# Patient Record
Sex: MALE | Race: WHITE | NOT HISPANIC OR LATINO | Employment: FULL TIME | URBAN - METROPOLITAN AREA
[De-identification: names, ages, dates, MRNs, and addresses within clinical notes are randomized per-mention and may not be internally consistent; named-entity substitution may affect disease eponyms.]

---

## 2020-06-04 ENCOUNTER — VIRTUAL VISIT (OUTPATIENT)
Dept: FAMILY MEDICINE | Facility: CLINIC | Age: 40
End: 2020-06-04
Payer: COMMERCIAL

## 2020-06-04 DIAGNOSIS — Z83.49 FAMILY HISTORY OF HEMOCHROMATOSIS: ICD-10-CM

## 2020-06-04 PROCEDURE — 99203 OFFICE O/P NEW LOW 30 MIN: CPT | Mod: TEL | Performed by: FAMILY MEDICINE

## 2020-06-04 SDOH — HEALTH STABILITY: MENTAL HEALTH: HOW OFTEN DO YOU HAVE A DRINK CONTAINING ALCOHOL?: NOT ASKED

## 2020-06-04 NOTE — PROGRESS NOTES
"Kahlil Farley is a 39 year old male who is being evaluated via a billable telephone visit.      The patient has been notified of following:     \"This telephone visit will be conducted via a call between you and your physician/provider. We have found that certain health care needs can be provided without the need for a physical exam.  This service lets us provide the care you need with a short phone conversation.  If a prescription is necessary we can send it directly to your pharmacy.  If lab work is needed we can place an order for that and you can then stop by our lab to have the test done at a later time.    Telephone visits are billed at different rates depending on your insurance coverage. During this emergency period, for some insurers they may be billed the same as an in-person visit.  Please reach out to your insurance provider with any questions.    If during the course of the call the physician/provider feels a telephone visit is not appropriate, you will not be charged for this service.\"    Patient has given verbal consent for Telephone visit?  Yes    What phone number would you like to be contacted at? [phone]    How would you like to obtain your AVS? Mail a copy    Subjective     Kahlil Farley is a 39 year old male who presents via phone visit today for the following health issues:    HPI  Concern - Pt moved here a year ago from [place], and wants to establish care here. He recently got labs done for life insurance and results came back abnormal, would like these tests done here.     There is no problem list on file for this patient.    No past surgical history on file.    Social History     Tobacco Use     Smoking status: Never Smoker     Smokeless tobacco: Never Used   Substance Use Topics     Alcohol use: Yes     No family history on file.      No current outpatient medications on file.     Allergies   Allergen Reactions     No Clinical Screening - See Comments Hives     Per pt \"some kind of " "antibiotic but is unsure which one\"      No lab results found.   BP Readings from Last 3 Encounters:   No data found for BP    Wt Readings from Last 3 Encounters:   No data found for Wt                    Reviewed and updated as needed this visit by Provider         Review of Systems   Constitutional, HEENT, cardiovascular, pulmonary, gi and gu systems are negative, except as otherwise noted.       Objective   Reported vitals:  There were no vitals taken for this visit.   healthy, alert and no distress  PSYCH: Alert and oriented times 3; coherent speech, normal   rate and volume, able to articulate logical thoughts, able   to abstract reason, no tangential thoughts, no hallucinations   or delusions  His affect is normal  RESP: No cough, no audible wheezing, able to talk in full sentences  Remainder of exam unable to be completed due to telephone visits    Diagnostic Test Results:  Labs reviewed in Epic        Assessment/Plan:  1. Elevation of level of transaminase or lactic acid dehydrogenase (LDH)  Has been having increased AST/ ALT, failed passing life insurance setup. He has past h/o transaminitis,   Will have him to recheck the lab and consider US if indicated   - CBC with platelets and differential; Future  - Comprehensive metabolic panel (BMP + Alb, Alk Phos, ALT, AST, Total. Bili, TP); Future  - Lipid panel reflex to direct LDL Fasting; Future  - Ferritin; Future  - Iron and iron binding capacity; Future  - Drug abuse screen 8 urine (UR); Future    2. BMI 25.0-25.9,adult  Has Ht 5-11 and Wt 185  Doing regular exercise and watching diet, will review the lab and update pt    - CBC with platelets and differential; Future  - Comprehensive metabolic panel (BMP + Alb, Alk Phos, ALT, AST, Total. Bili, TP); Future  - Lipid panel reflex to direct LDL Fasting; Future  - Ferritin; Future  - Iron and iron binding capacity; Future  - Drug abuse screen 8 urine (UR); Future    3. Family history of hemochromatosis  Has no " sx, due to the family h/o he has been monitoring, will recheck lab for further evaluation   - CBC with platelets and differential; Future  - Comprehensive metabolic panel (BMP + Alb, Alk Phos, ALT, AST, Total. Bili, TP); Future  - Lipid panel reflex to direct LDL Fasting; Future  - Ferritin; Future  - Iron and iron binding capacity; Future  - Drug abuse screen 8 urine (UR); Future    Return in about 3 months (around 9/4/2020) for Physical Exam, Lab Work.        He stated he has not on any med or drug, but wants to do the drug screening to prove the life insurance company as additional documents, urine toxicology screening order placed      Phone call duration:  13 minutes    Randall Rodríguez MD

## 2020-06-08 DIAGNOSIS — Z83.49 FAMILY HISTORY OF HEMOCHROMATOSIS: ICD-10-CM

## 2020-06-08 LAB
BASOPHILS # BLD AUTO: 0 10E9/L (ref 0–0.2)
BASOPHILS NFR BLD AUTO: 0.6 %
DIFFERENTIAL METHOD BLD: NORMAL
EOSINOPHIL # BLD AUTO: 0.2 10E9/L (ref 0–0.7)
EOSINOPHIL NFR BLD AUTO: 2.6 %
ERYTHROCYTE [DISTWIDTH] IN BLOOD BY AUTOMATED COUNT: 12.4 % (ref 10–15)
HCT VFR BLD AUTO: 40.7 % (ref 40–53)
HGB BLD-MCNC: 14.1 G/DL (ref 13.3–17.7)
LYMPHOCYTES # BLD AUTO: 1.8 10E9/L (ref 0.8–5.3)
LYMPHOCYTES NFR BLD AUTO: 28.4 %
MCH RBC QN AUTO: 31.8 PG (ref 26.5–33)
MCHC RBC AUTO-ENTMCNC: 34.6 G/DL (ref 31.5–36.5)
MCV RBC AUTO: 92 FL (ref 78–100)
MONOCYTES # BLD AUTO: 0.4 10E9/L (ref 0–1.3)
MONOCYTES NFR BLD AUTO: 7.1 %
NEUTROPHILS # BLD AUTO: 3.8 10E9/L (ref 1.6–8.3)
NEUTROPHILS NFR BLD AUTO: 61.3 %
PLATELET # BLD AUTO: 271 10E9/L (ref 150–450)
RBC # BLD AUTO: 4.43 10E12/L (ref 4.4–5.9)
WBC # BLD AUTO: 6.2 10E9/L (ref 4–11)

## 2020-06-08 PROCEDURE — 80053 COMPREHEN METABOLIC PANEL: CPT | Performed by: FAMILY MEDICINE

## 2020-06-08 PROCEDURE — 36415 COLL VENOUS BLD VENIPUNCTURE: CPT | Performed by: FAMILY MEDICINE

## 2020-06-08 PROCEDURE — 80061 LIPID PANEL: CPT | Performed by: FAMILY MEDICINE

## 2020-06-08 PROCEDURE — 85025 COMPLETE CBC W/AUTO DIFF WBC: CPT | Performed by: FAMILY MEDICINE

## 2020-06-08 PROCEDURE — 80307 DRUG TEST PRSMV CHEM ANLYZR: CPT | Performed by: FAMILY MEDICINE

## 2020-06-08 PROCEDURE — 86803 HEPATITIS C AB TEST: CPT | Performed by: FAMILY MEDICINE

## 2020-06-08 PROCEDURE — 87340 HEPATITIS B SURFACE AG IA: CPT | Performed by: FAMILY MEDICINE

## 2020-06-08 PROCEDURE — 86706 HEP B SURFACE ANTIBODY: CPT | Performed by: FAMILY MEDICINE

## 2020-06-08 PROCEDURE — 83540 ASSAY OF IRON: CPT | Performed by: FAMILY MEDICINE

## 2020-06-08 PROCEDURE — 83550 IRON BINDING TEST: CPT | Performed by: FAMILY MEDICINE

## 2020-06-08 PROCEDURE — 80320 DRUG SCREEN QUANTALCOHOLS: CPT | Performed by: FAMILY MEDICINE

## 2020-06-08 PROCEDURE — 82728 ASSAY OF FERRITIN: CPT | Performed by: FAMILY MEDICINE

## 2020-06-09 DIAGNOSIS — R74.01 TRANSAMINITIS: Primary | ICD-10-CM

## 2020-06-09 LAB
ALBUMIN SERPL-MCNC: 3.8 G/DL (ref 3.4–5)
ALP SERPL-CCNC: 144 U/L (ref 40–150)
ALT SERPL W P-5'-P-CCNC: 145 U/L (ref 0–70)
AMPHETAMINES UR QL SCN: NEGATIVE
ANION GAP SERPL CALCULATED.3IONS-SCNC: 8 MMOL/L (ref 3–14)
AST SERPL W P-5'-P-CCNC: 71 U/L (ref 0–45)
BARBITURATES UR QL: NEGATIVE
BENZODIAZ UR QL: NEGATIVE
BILIRUB SERPL-MCNC: 0.7 MG/DL (ref 0.2–1.3)
BUN SERPL-MCNC: 9 MG/DL (ref 7–30)
CALCIUM SERPL-MCNC: 9 MG/DL (ref 8.5–10.1)
CANNABINOIDS UR QL SCN: NEGATIVE
CHLORIDE SERPL-SCNC: 107 MMOL/L (ref 94–109)
CHOLEST SERPL-MCNC: 233 MG/DL
CO2 SERPL-SCNC: 23 MMOL/L (ref 20–32)
COCAINE UR QL: NEGATIVE
CREAT SERPL-MCNC: 0.77 MG/DL (ref 0.66–1.25)
ETHANOL UR QL SCN: NEGATIVE
FERRITIN SERPL-MCNC: 191 NG/ML (ref 26–388)
GFR SERPL CREATININE-BSD FRML MDRD: >90 ML/MIN/{1.73_M2}
GLUCOSE SERPL-MCNC: 76 MG/DL (ref 70–99)
HBV SURFACE AB SERPL IA-ACNC: 56.47 M[IU]/ML
HBV SURFACE AG SERPL QL IA: NONREACTIVE
HCV AB SERPL QL IA: NONREACTIVE
HDLC SERPL-MCNC: 56 MG/DL
IRON SATN MFR SERPL: 52 % (ref 15–46)
IRON SERPL-MCNC: 167 UG/DL (ref 35–180)
LDLC SERPL CALC-MCNC: 158 MG/DL
NONHDLC SERPL-MCNC: 177 MG/DL
OPIATES UR QL SCN: NEGATIVE
PCP UR QL SCN: NEGATIVE
POTASSIUM SERPL-SCNC: 3.8 MMOL/L (ref 3.4–5.3)
PROT SERPL-MCNC: 7.6 G/DL (ref 6.8–8.8)
SODIUM SERPL-SCNC: 138 MMOL/L (ref 133–144)
TIBC SERPL-MCNC: 322 UG/DL (ref 240–430)
TRIGL SERPL-MCNC: 94 MG/DL

## 2020-06-23 ENCOUNTER — HOSPITAL ENCOUNTER (OUTPATIENT)
Dept: ULTRASOUND IMAGING | Facility: CLINIC | Age: 40
Discharge: HOME | End: 2020-06-23
Attending: FAMILY MEDICINE | Admitting: FAMILY MEDICINE
Payer: COMMERCIAL

## 2020-06-23 DIAGNOSIS — R74.01 TRANSAMINITIS: ICD-10-CM

## 2020-06-23 PROCEDURE — 76700 US EXAM ABDOM COMPLETE: CPT

## 2020-06-26 ENCOUNTER — MYC MEDICAL ADVICE (OUTPATIENT)
Dept: FAMILY MEDICINE | Facility: CLINIC | Age: 40
End: 2020-06-26

## 2020-06-26 NOTE — TELEPHONE ENCOUNTER
Please see "[employer] message and advise.      Thank you,  Angie GELLERRN BSN  [facility]  [phone]

## 2020-10-29 ENCOUNTER — OFFICE VISIT (OUTPATIENT)
Dept: FAMILY MEDICINE | Facility: CLINIC | Age: 40
End: 2020-10-29
Payer: COMMERCIAL

## 2020-10-29 VITALS
HEIGHT: 71 IN | DIASTOLIC BLOOD PRESSURE: 74 MMHG | WEIGHT: 178 LBS | TEMPERATURE: 98 F | OXYGEN SATURATION: 99 % | SYSTOLIC BLOOD PRESSURE: 128 MMHG | HEART RATE: 79 BPM | BODY MASS INDEX: 24.92 KG/M2

## 2020-10-29 DIAGNOSIS — Z23 NEED FOR PROPHYLACTIC VACCINATION AND INOCULATION AGAINST INFLUENZA: ICD-10-CM

## 2020-10-29 DIAGNOSIS — Z00.00 ROUTINE GENERAL MEDICAL EXAMINATION AT A HEALTH CARE FACILITY: Primary | ICD-10-CM

## 2020-10-29 DIAGNOSIS — K40.90 RIGHT INGUINAL HERNIA: ICD-10-CM

## 2020-10-29 DIAGNOSIS — F33.9 EPISODE OF RECURRENT MAJOR DEPRESSIVE DISORDER, UNSPECIFIED DEPRESSION EPISODE SEVERITY (H): ICD-10-CM

## 2020-10-29 PROCEDURE — 90471 IMMUNIZATION ADMIN: CPT | Performed by: FAMILY MEDICINE

## 2020-10-29 PROCEDURE — 90686 IIV4 VACC NO PRSV 0.5 ML IM: CPT | Performed by: FAMILY MEDICINE

## 2020-10-29 PROCEDURE — 99396 PREV VISIT EST AGE 40-64: CPT | Mod: 25 | Performed by: FAMILY MEDICINE

## 2020-10-29 RX ORDER — DULOXETIN HYDROCHLORIDE 20 MG/1
20 CAPSULE, DELAYED RELEASE ORAL DAILY
Qty: 90 CAPSULE | Refills: 1 | Status: SHIPPED | OUTPATIENT
Start: 2020-10-29 | End: 2021-04-21

## 2020-10-29 ASSESSMENT — MIFFLIN-ST. JEOR: SCORE: 1734.53

## 2020-10-29 NOTE — PROGRESS NOTES
3  SUBJECTIVE:   CC: Kahlil Farley is an 39 year old male who presents for preventive health visit.     Patient has been advised of split billing requirements and indicates understanding: Yes  Healthy Habits:  Answers for HPI/ROS submitted by the patient on 10/26/2020   Annual Exam:  Frequency of exercise:: 4-5 days/week  Getting at least 3 servings of Calcium per day:: NO  Diet:: Regular (no restrictions)  Taking medications regularly:: Yes  Medication side effects:: Not applicable  Bi-annual eye exam:: Yes  Dental care twice a year:: NO  Sleep apnea or symptoms of sleep apnea:: None  Additional concerns today:: Yes  Duration of exercise:: 15-30 minutes          Today's PHQ-2 Score:   PHQ-2 ( 1999 [employer]) 10/26/2020   Q1: Little interest or pleasure in doing things 0   Q2: Feeling down, depressed or hopeless 1   PHQ-2 Score 1   Q1: Little interest or pleasure in doing things Not at all   Q2: Feeling down, depressed or hopeless Several days   PHQ-2 Score 1       Abuse: Current or Past(Physical, Sexual or Emotional)- No  Do you feel safe in your environment? Yes        Social History     Tobacco Use     Smoking status: Never Smoker     Smokeless tobacco: Never Used   Substance Use Topics     Alcohol use: Yes     If you drink alcohol do you typically have >3 drinks per day or >7 drinks per week? No                      Last PSA: No results found for: PSA    Reviewed orders with patient. Reviewed health maintenance and updated orders accordingly - Yes  BP Readings from Last 3 Encounters:   10/29/20 128/74    Wt Readings from Last 3 Encounters:   10/29/20 80.7 kg (178 lb)                  There is no problem list on file for this patient.    No past surgical history on file.    Social History     Tobacco Use     Smoking status: Never Smoker     Smokeless tobacco: Never Used   Substance Use Topics     Alcohol use: Yes     No family history on file.      Current Outpatient Medications   Medication Sig Dispense Refill      "DULoxetine (CYMBALTA) 20 MG capsule Take 1 capsule (20 mg) by mouth daily 90 capsule 1     Allergies   Allergen Reactions     No Clinical Screening - See Comments Hives     Per pt \"some kind of antibiotic but is unsure which one\"      Recent Labs   Lab Test 06/08/20  1316   *   HDL 56   TRIG 94   *   CR 0.77   GFRESTIMATED >90   GFRESTBLACK >90   POTASSIUM 3.8        Reviewed and updated as needed this visit by clinical staff                 Reviewed and updated as needed this visit by Provider                No past medical history on file.   No past surgical history on file.    ROS:  CONSTITUTIONAL: NEGATIVE for fever, chills, change in weight  INTEGUMENTARY/SKIN: NEGATIVE for worrisome rashes, moles or lesions  EYES: NEGATIVE for vision changes or irritation  ENT: NEGATIVE for ear, mouth and throat problems  RESP: NEGATIVE for significant cough or SOB  CV: NEGATIVE for chest pain, palpitations or peripheral edema  GI: NEGATIVE for nausea, abdominal pain, heartburn, or change in bowel habits   male: negative for dysuria, hematuria, decreased urinary stream, erectile dysfunction, urethral discharge  MUSCULOSKELETAL: NEGATIVE for significant arthralgias or myalgia  NEURO: NEGATIVE for weakness, dizziness or paresthesias  PSYCHIATRIC: NEGATIVE for changes in mood or affect    OBJECTIVE:   /74   Pulse 79   Temp 98  F (36.7  C) (Tympanic)   Ht 1.803 m (5' 11\")   Wt 80.7 kg (178 lb)   SpO2 99%   BMI 24.83 kg/m    EXAM:  GENERAL: healthy, alert and no distress  EYES: Eyes grossly normal to inspection, PERRL and conjunctivae and sclerae normal  HENT: ear canals and TM's normal, nose and mouth without ulcers or lesions  NECK: no adenopathy, no asymmetry, masses, or scars and thyroid normal to palpation  RESP: lungs clear to auscultation - no rales, rhonchi or wheezes  CV: regular rate and rhythm, normal S1 S2, no S3 or S4, no murmur, click or rub, no peripheral edema and peripheral pulses " "strong  ABDOMEN: soft, nontender, no hepatosplenomegaly, no masses and bowel sounds normal   (male): testicles normal without atrophy or masses, hernia right and penis normal without urethral discharge  MS: no gross musculoskeletal defects noted, no edema  NEURO: Normal strength and tone, mentation intact and speech normal  BACK: no CVA tenderness, no paralumbar tenderness        ASSESSMENT/PLAN:       ICD-10-CM    1. Routine general medical examination at a health care facility  Z00.00 CBC with platelets     Comprehensive metabolic panel (BMP + Alb, Alk Phos, ALT, AST, Total. Bili, TP)     Lipid panel reflex to direct LDL Fasting     CANCELED: INFLUENZA VACCINE IM > 6 MONTHS VALENT IIV4 [47375]     CANCELED: CBC with platelets     CANCELED: Comprehensive metabolic panel (BMP + Alb, Alk Phos, ALT, AST, Total. Bili, TP)     CANCELED: Lipid panel reflex to direct LDL Fasting   2. Right inguinal hernia  K40.90 GENERAL SURG ADULT REFERRAL   3. Episode of recurrent major depressive disorder, unspecified depression episode severity (H)  F33.9 DULoxetine (CYMBALTA) 20 MG capsule       Patient has been advised of split billing requirements and indicates understanding: Yes  COUNSELING:  Reviewed preventive health counseling, as reflected in patient instructions    Estimated body mass index is 24.83 kg/m  as calculated from the following:    Height as of this encounter: 1.803 m (5' 11\").    Weight as of this encounter: 80.7 kg (178 lb).        He reports that he has never smoked. He has never used smokeless tobacco.      Counseling Resources:  ATP IV Guidelines  Pooled Cohorts Equation Calculator  FRAX Risk Assessment  ICSI Preventive Guidelines  Dietary Guidelines for Americans, 2010  USDA's MyPlate  ASA Prophylaxis  Lung CA Screening    Randall Rodríguez MD  [facility]  "

## 2020-11-06 ENCOUNTER — OFFICE VISIT (OUTPATIENT)
Dept: SURGERY | Facility: CLINIC | Age: 40
End: 2020-11-06
Attending: FAMILY MEDICINE
Payer: COMMERCIAL

## 2020-11-06 VITALS
WEIGHT: 178 LBS | HEIGHT: 71 IN | BODY MASS INDEX: 24.92 KG/M2 | HEART RATE: 71 BPM | SYSTOLIC BLOOD PRESSURE: 104 MMHG | DIASTOLIC BLOOD PRESSURE: 60 MMHG

## 2020-11-06 DIAGNOSIS — K42.9 UMBILICAL HERNIA WITHOUT OBSTRUCTION AND WITHOUT GANGRENE: ICD-10-CM

## 2020-11-06 DIAGNOSIS — K40.20 NON-RECURRENT BILATERAL INGUINAL HERNIA WITHOUT OBSTRUCTION OR GANGRENE: Primary | ICD-10-CM

## 2020-11-06 PROCEDURE — 99204 OFFICE O/P NEW MOD 45 MIN: CPT | Performed by: SURGERY

## 2020-11-06 ASSESSMENT — MIFFLIN-ST. JEOR: SCORE: 1734.53

## 2020-11-06 NOTE — LETTER
"2020       Re: Kahlil Farley - 1980    HISTORY OF PRESENT ILLNESS:  Kahlil Farley is a 39 year old male who is seen in consultation at the request of Dr. Rodríguez for evaluation of right inguinal hernia.  The patient reports that he has noticed a bulge present in the right inguinal region for approximately 7 to 8 years.  In the last year, the bulge has increased in size.  He does have discomfort associated with the bulge.  His symptoms seem to be worse with increased activities.  He does state that the bulge is always reducible.  He denies any obstructive symptoms.  The patient has not noticed any bulging or discomfort present in the left inguinal area or at the umbilicus.  No previous abdominal surgical procedures.  No significant family history of hernias.  Patient has not undergone any previous hernia repairs.  He denies use of tobacco.     REVIEW OF SYSTEMS:  Constitutional: No fevers or chills  Eyes: No blurred or double vision  HENT: Denies headaches, No rhinorrhea, No sore throat  Respiratory: No cough or shortness of breath  Cardiovascular: Denies chest pain or palpitations  Gastrointestinal: No abdominal pain or nausea/vomiting  Genitourinary: No hematuria or dysuria  Musculoskeletal: Denies arthralgias or myalgias  Neurologic: No numbness or tingling  Integumentary: No skin rashes     Past medical history:  -Depression     Past surgical history:  -No previous abdominal surgical procedures     Family history:  -No history of hernias     Vitals: /60   Pulse 71   Ht 1.803 m (5' 11\")   Wt 80.7 kg (178 lb)   BMI 24.83 kg/m    BMI= Body mass index is 24.83 kg/m .     EXAM:  General: Vital signs reviewed, in no apparent distress  Eyes: Anicteric  HENT: Normocephalic, atraumatic, trachea midline   Respiratory: Breathing nonlabored  Cardiovascular: Regular rate and rhythm  GI: Abdomen soft, nondistended, nontender; reducible umbilical hernia with approximately 1 cm fascial defect; " reducible moderate sized right inguinal hernia and small left inguinal hernia  Musculoskeletal: No gross deformities  Neurologic: Grossly nonfocal exam  Psychiatric: Normal mood, affect and insight  Integumentary: Warm and dry      ASSESSMENT:  Kahlil Farley is a 39 year old who presents with umbilical hernia and bilateral inguinal hernias. Significant pertinent co-morbidities include: None.      PLAN:  Following a thorough discussion with the patient, the decision was made to proceed to the operating room for robotic repair of the patient's umbilical hernia and bilateral inguinal hernias with placement of mesh.  The risks and benefits of the procedure were discussed with the patient.  Surgery will be scheduled at the patient's earliest convenience.     It was my pleasure to participate in the care of Kahlil Farley in clinic today. Thank you for this consultation.            Mya Ji MD

## 2020-11-06 NOTE — PROGRESS NOTES
"[facility] General Surgery Clinic Consultation    CHIEF COMPLAINT:  Chief Complaint   Patient presents with     Consult     [facility]        HISTORY OF PRESENT ILLNESS:  Kahlil Farley is a 39 year old male who is seen in consultation at the request of Dr. Rodríguez for evaluation of right inguinal hernia.  The patient reports that he has noticed a bulge present in the right inguinal region for approximately 7 to 8 years.  In the last year, the bulge has increased in size.  He does have discomfort associated with the bulge.  His symptoms seem to be worse with increased activities.  He does state that the bulge is always reducible.  He denies any obstructive symptoms.  The patient has not noticed any bulging or discomfort present in the left inguinal area or at the umbilicus.  No previous abdominal surgical procedures.  No significant family history of hernias.  Patient has not undergone any previous hernia repairs.  He denies use of tobacco.    REVIEW OF SYSTEMS:  Constitutional: No fevers or chills  Eyes: No blurred or double vision  HENT: Denies headaches, No rhinorrhea, No sore throat  Respiratory: No cough or shortness of breath  Cardiovascular: Denies chest pain or palpitations  Gastrointestinal: No abdominal pain or nausea/vomiting  Genitourinary: No hematuria or dysuria  Musculoskeletal: Denies arthralgias or myalgias  Neurologic: No numbness or tingling  Integumentary: No skin rashes    Past medical history:  -Depression    Past surgical history:  -No previous abdominal surgical procedures    Family history:  -No history of hernias    Social History     Tobacco Use     Smoking status: Never Smoker     Smokeless tobacco: Never Used   Substance Use Topics     Alcohol use: Yes       There is no problem list on file for this patient.      Allergies   Allergen Reactions     No Clinical Screening - See Comments Hives     Per pt \"some kind of antibiotic but is unsure which one\"        Current Outpatient Medications   Medication " "Sig Dispense Refill     DULoxetine (CYMBALTA) 20 MG capsule Take 1 capsule (20 mg) by mouth daily 90 capsule 1       Vitals: /60   Pulse 71   Ht 1.803 m (5' 11\")   Wt 80.7 kg (178 lb)   BMI 24.83 kg/m    BMI= Body mass index is 24.83 kg/m .    EXAM:  General: Vital signs reviewed, in no apparent distress  Eyes: Anicteric  HENT: Normocephalic, atraumatic, trachea midline   Respiratory: Breathing nonlabored  Cardiovascular: Regular rate and rhythm  GI: Abdomen soft, nondistended, nontender; reducible umbilical hernia with approximately 1 cm fascial defect; reducible moderate sized right inguinal hernia and small left inguinal hernia  Musculoskeletal: No gross deformities  Neurologic: Grossly nonfocal exam  Psychiatric: Normal mood, affect and insight  Integumentary: Warm and dry      ASSESSMENT:  Kahlil Farley is a 39 year old who presents with umbilical hernia and bilateral inguinal hernias.  Significant pertinent co-morbidities include: None.       PLAN:  Following a thorough discussion with the patient, the decision was made to proceed to the operating room for robotic repair of the patient's umbilical hernia and bilateral inguinal hernias with placement of mesh.  The risks and benefits of the procedure were discussed with the patient.  Surgery will be scheduled at the patient's earliest convenience.    It was my pleasure to participate in the care of Kahlil Farley in clinic today. Thank you for this consultation.         Mya Ji MD    Please route or send letter to:  Primary Care Provider (PCP) and Referring Provider    "

## 2020-11-23 ENCOUNTER — OFFICE VISIT (OUTPATIENT)
Dept: FAMILY MEDICINE | Facility: CLINIC | Age: 40
End: 2020-11-23
Payer: COMMERCIAL

## 2020-11-23 VITALS
WEIGHT: 179 LBS | HEIGHT: 71 IN | SYSTOLIC BLOOD PRESSURE: 130 MMHG | TEMPERATURE: 97.9 F | BODY MASS INDEX: 25.06 KG/M2 | OXYGEN SATURATION: 98 % | DIASTOLIC BLOOD PRESSURE: 76 MMHG | HEART RATE: 58 BPM

## 2020-11-23 DIAGNOSIS — Z01.818 PREOP GENERAL PHYSICAL EXAM: Primary | ICD-10-CM

## 2020-11-23 LAB — HGB BLD-MCNC: 13.6 G/DL (ref 13.3–17.7)

## 2020-11-23 PROCEDURE — 85018 HEMOGLOBIN: CPT | Performed by: FAMILY MEDICINE

## 2020-11-23 PROCEDURE — 36415 COLL VENOUS BLD VENIPUNCTURE: CPT | Performed by: FAMILY MEDICINE

## 2020-11-23 PROCEDURE — 99214 OFFICE O/P EST MOD 30 MIN: CPT | Performed by: FAMILY MEDICINE

## 2020-11-23 ASSESSMENT — MIFFLIN-ST. JEOR: SCORE: 1739.07

## 2020-11-23 NOTE — PATIENT INSTRUCTIONS

## 2020-11-23 NOTE — PROGRESS NOTES
[address] [address]  Phone: [phone]  Primary Provider: Eliezer Rodríguez  Pre-op Performing Provider: ELIEZER RODRÍGUEZ    PREOPERATIVE EVALUATION:  Today's date: 11/23/2020    Kahlil Farley is a 39 year old male who presents for a preoperative evaluation.    Surgical Information:  Surgery/Procedure: Hernia  Surgery Location: [facility]  Surgeon: Dr. Ji  Surgery Date: 12/1/20  Time of Surgery: 830am  Where patient plans to recover: At home with family  Fax number for surgical facility: [phone]    Type of Anesthesia Anticipated: General    Subjective     HPI related to upcoming procedure:     Preop Questions 11/16/2020   1. Have you ever had a heart attack or stroke? No   2. Have you ever had surgery on your heart or blood vessels, such as a stent placement, a coronary artery bypass, or surgery on an artery in your head, neck, heart, or legs? No   3. Do you have chest pain with activity? No   4. Do you have a history of  heart failure? No   5. Do you currently have a cold, bronchitis or symptoms of other infection? No   6. Do you have a cough, shortness of breath, or wheezing? No   7. Do you or anyone in your family have previous history of blood clots? No   8. Do you or does anyone in your family have a serious bleeding problem such as prolonged bleeding following surgeries or cuts? No   9. Have you ever had problems with anemia or been told to take iron pills? No   10. Have you had any abnormal blood loss such as black, tarry or bloody stools? No   11. Have you ever had a blood transfusion? No   12. Are you willing to have a blood transfusion if it is medically needed before, during, or after your surgery? Yes   13. Have you or any of your relatives ever had problems with anesthesia? No   14. Do you have sleep apnea, excessive snoring or daytime drowsiness? No   15. Do you have any artifical heart valves or other implanted medical  "devices like a pacemaker, defibrillator, or continuous glucose monitor? No   16. Do you have artificial joints? No   17. Are you allergic to latex? No       Health Care Directive:  Patient does not have a Health Care Directive or Living Will: Discussed advance care planning with patient; however, patient declined at this time.    Preoperative Review of :   reviewed - no record of controlled substances prescribed.          Review of Systems  CONSTITUTIONAL: NEGATIVE for fever, chills, change in weight  ENT/MOUTH: NEGATIVE for ear, mouth and throat problems  RESP: NEGATIVE for significant cough or SOB  CV: NEGATIVE for chest pain, palpitations or peripheral edema    There are no active problems to display for this patient.     No past medical history on file.  No past surgical history on file.  Current Outpatient Medications   Medication Sig Dispense Refill     DULoxetine (CYMBALTA) 20 MG capsule Take 1 capsule (20 mg) by mouth daily 90 capsule 1       Allergies   Allergen Reactions     No Clinical Screening - See Comments Hives     Per pt \"some kind of antibiotic but is unsure which one\"         Social History     Tobacco Use     Smoking status: Never Smoker     Smokeless tobacco: Never Used   Substance Use Topics     Alcohol use: Yes     No family history on file.  History   Drug Use Unknown         Objective     /76   Pulse 58   Temp 97.9  F (36.6  C) (Tympanic)   Ht 1.803 m (5' 11\")   Wt 81.2 kg (179 lb)   SpO2 98%   BMI 24.97 kg/m      Physical Exam  GENERAL APPEARANCE: healthy, alert and no distress  HENT: ear canals and TM's normal and nose and mouth without ulcers or lesions  RESP: lungs clear to auscultation - no rales, rhonchi or wheezes  CV: regular rate and rhythm, normal S1 S2, no S3 or S4 and no murmur, click or rub   ABDOMEN: soft, nontender, no HSM or masses and bowel sounds normal  NEURO: Normal strength and tone, sensory exam grossly normal, mentation intact and speech " normal    Recent Labs   Lab Test 06/08/20  1316   HGB 14.1         POTASSIUM 3.8   CR 0.77        Diagnostics:  Recent Results (from the past 24 hour(s))   Hemoglobin    Collection Time: 11/23/20  5:13 PM   Result Value Ref Range    Hemoglobin 13.6 13.3 - 17.7 g/dL      No EKG required, no history of coronary heart disease, significant arrhythmia, peripheral arterial disease or other structural heart disease.    Revised Cardiac Risk Index (RCRI):  The patient has the following serious cardiovascular risks for perioperative complications:   - No serious cardiac risks = 0 points     RCRI Interpretation: 0 points: Class I (very low risk - 0.4% complication rate)           Assessment & Plan   The proposed surgical procedure is considered LOW risk.    Kahlil was seen today for pre-op exam.    Diagnoses and all orders for this visit:    Preop general physical exam  -     Hemoglobin           Risks and Recommendations:  The patient has the following additional risks and recommendations for perioperative complications:   - No identified additional risk factors other than previously addressed    Medication Instructions:   - ibuprofen (Advil, Motrin): HOLD 1 day before surgery.     RECOMMENDATION:  APPROVAL GIVEN to proceed with proposed procedure, without further diagnostic evaluation.    Signed Electronically by: Randall Rodríguez MD    Copy of this evaluation report is provided to requesting physician.    Preop [facility] Preop Guidelines    Revised Cardiac Risk Index

## 2020-11-30 ENCOUNTER — TELEPHONE (OUTPATIENT)
Dept: SURGERY | Facility: CLINIC | Age: 40
End: 2020-11-30

## 2020-11-30 NOTE — TELEPHONE ENCOUNTER
Patient wondering how long he needs to quarantine and how long his family needs to. Positive test result on Wednesday.  He is presently asymptomatic. He reports he had slight cold symptoms and loss of smell 2-3 weeks ago.  Smell returned approximately one week ago.    Informed him that he should quarantine for 10-14 days from the end of symptoms.  As for his family, recommended quarantine is 14 days from positive test.     However, advised him to call his PCP to discuss as protocols are always changing.    He agreed to call his PCP.    Ana Cam, JUVE-BSN

## 2021-01-18 ENCOUNTER — OFFICE VISIT (OUTPATIENT)
Dept: FAMILY MEDICINE | Facility: CLINIC | Age: 41
End: 2021-01-18
Payer: COMMERCIAL

## 2021-01-18 VITALS
HEIGHT: 71 IN | WEIGHT: 184 LBS | BODY MASS INDEX: 25.76 KG/M2 | SYSTOLIC BLOOD PRESSURE: 112 MMHG | TEMPERATURE: 97.1 F | HEART RATE: 71 BPM | DIASTOLIC BLOOD PRESSURE: 72 MMHG | OXYGEN SATURATION: 98 %

## 2021-01-18 DIAGNOSIS — Z01.818 PREOP GENERAL PHYSICAL EXAM: Primary | ICD-10-CM

## 2021-01-18 DIAGNOSIS — K40.90 INGUINAL HERNIA WITHOUT OBSTRUCTION OR GANGRENE, RECURRENCE NOT SPECIFIED, UNSPECIFIED LATERALITY: ICD-10-CM

## 2021-01-18 PROCEDURE — 99213 OFFICE O/P EST LOW 20 MIN: CPT | Performed by: FAMILY MEDICINE

## 2021-01-18 ASSESSMENT — MIFFLIN-ST. JEOR: SCORE: 1756.75

## 2021-01-18 NOTE — PATIENT INSTRUCTIONS

## 2021-01-18 NOTE — PROGRESS NOTES
[address] [address]  Phone: [phone]  Primary Provider: Randall Rodríguez      PREOPERATIVE EVALUATION:  Today's date: 1/18/2021    Kahlil Farley is a 40 year old male who presents for a preoperative evaluation.    Surgical Information:  Surgery/Procedure:Hernia  Surgery Location: [facility]  Surgeon:   Surgery Date: 1/19/21  Time of Surgery: 6am  Where patient plans to recover: At home with family  Fax number for surgical facility: [phone]    Type of Anesthesia Anticipated: General    Subjective     HPI related to upcoming procedure:     Preop Questions 1/14/2021   1. Have you ever had a heart attack or stroke? No   2. Have you ever had surgery on your heart or blood vessels, such as a stent placement, a coronary artery bypass, or surgery on an artery in your head, neck, heart, or legs? No   3. Do you have chest pain with activity? No   4. Do you have a history of  heart failure? No   5. Do you currently have a cold, bronchitis or symptoms of other infection? No   6. Do you have a cough, shortness of breath, or wheezing? No   7. Do you or anyone in your family have previous history of blood clots? No   8. Do you or does anyone in your family have a serious bleeding problem such as prolonged bleeding following surgeries or cuts? No   9. Have you ever had problems with anemia or been told to take iron pills? No   10. Have you had any abnormal blood loss such as black, tarry or bloody stools? No   11. Have you ever had a blood transfusion? No   12. Are you willing to have a blood transfusion if it is medically needed before, during, or after your surgery? Yes   13. Have you or any of your relatives ever had problems with anesthesia? No   14. Do you have sleep apnea, excessive snoring or daytime drowsiness? No   15. Do you have any artifical heart valves or other implanted medical devices like a pacemaker, defibrillator, or  "continuous glucose monitor? No   16. Do you have artificial joints? No   17. Are you allergic to latex? No       Health Care Directive:  Patient does not have a Health Care Directive or Living Will: Discussed advance care planning with patient; however, patient declined at this time.    Preoperative Review of :   reviewed - no record of controlled substances prescribed.          Review of Systems  CONSTITUTIONAL: NEGATIVE for fever, chills, change in weight  ENT/MOUTH: NEGATIVE for ear, mouth and throat problems  RESP: NEGATIVE for significant cough or SOB  CV: NEGATIVE for chest pain, palpitations or peripheral edema    There are no active problems to display for this patient.     No past medical history on file.  No past surgical history on file.  Current Outpatient Medications   Medication Sig Dispense Refill     DULoxetine (CYMBALTA) 20 MG capsule Take 1 capsule (20 mg) by mouth daily 90 capsule 1       Allergies   Allergen Reactions     No Clinical Screening - See Comments Hives     Per pt \"some kind of antibiotic but is unsure which one\"         Social History     Tobacco Use     Smoking status: Never Smoker     Smokeless tobacco: Never Used   Substance Use Topics     Alcohol use: Yes     No family history on file.  History   Drug Use Unknown         Objective     /72   Pulse 71   Temp 97.1  F (36.2  C) (Tympanic)   Ht 1.803 m (5' 11\")   Wt 83.5 kg (184 lb)   SpO2 98%   BMI 25.66 kg/m      Physical Exam  GENERAL APPEARANCE: healthy, alert and no distress  HENT: ear canals and TM's normal and nose and mouth without ulcers or lesions  RESP: lungs clear to auscultation - no rales, rhonchi or wheezes  CV: regular rate and rhythm, normal S1 S2, no S3 or S4 and no murmur, click or rub   ABDOMEN: soft, nontender, no HSM or masses and bowel sounds normal  NEURO: Normal strength and tone, sensory exam grossly normal, mentation intact and speech normal    Recent Labs   Lab Test 11/23/20  1713 " 06/08/20  1316   HGB 13.6 14.1   PLT  --  271   NA  --  138   POTASSIUM  --  3.8   CR  --  0.77        Diagnostics:  No labs were ordered during this visit.   No EKG required for low risk surgery (cataract, skin procedure, breast biopsy, etc).    Revised Cardiac Risk Index (RCRI):  The patient has the following serious cardiovascular risks for perioperative complications:   - No serious cardiac risks = 0 points     RCRI Interpretation: 0 points: Class I (very low risk - 0.4% complication rate)           Assessment & Plan   The proposed surgical procedure is considered LOW risk.    Preop general physical exam        Medication Instructions:   - ibuprofen (Advil, Motrin): HOLD 1 day before surgery.     RECOMMENDATION:  APPROVAL GIVEN to proceed with proposed procedure, without further diagnostic evaluation.    Signed Electronically by: Randall Rodríguez MD    Copy of this evaluation report is provided to requesting physician.    Preop [facility] Preop Guidelines    Revised Cardiac Risk Index

## 2021-01-19 ENCOUNTER — APPOINTMENT (OUTPATIENT)
Dept: SURGERY | Facility: PHYSICIAN GROUP | Age: 41
End: 2021-01-19
Payer: COMMERCIAL

## 2021-01-19 ENCOUNTER — TRANSFERRED RECORDS (OUTPATIENT)
Dept: HEALTH INFORMATION MANAGEMENT | Facility: CLINIC | Age: 41
End: 2021-01-19

## 2021-01-19 PROCEDURE — 49652 PR LAP VENT/ABD HERNIA REPAIR: CPT | Performed by: SURGERY

## 2021-01-19 PROCEDURE — S2900 ROBOTIC SURGICAL SYSTEM: HCPCS | Performed by: SURGERY

## 2021-01-19 PROCEDURE — 49650 LAP ING HERNIA REPAIR INIT: CPT | Mod: 50 | Performed by: SURGERY

## 2021-02-08 ENCOUNTER — MYC MEDICAL ADVICE (OUTPATIENT)
Dept: FAMILY MEDICINE | Facility: CLINIC | Age: 41
End: 2021-02-08

## 2021-02-08 DIAGNOSIS — Z00.00 ROUTINE GENERAL MEDICAL EXAMINATION AT A HEALTH CARE FACILITY: ICD-10-CM

## 2021-02-08 LAB
ERYTHROCYTE [DISTWIDTH] IN BLOOD BY AUTOMATED COUNT: 12.6 % (ref 10–15)
HCT VFR BLD AUTO: 42.5 % (ref 40–53)
HGB BLD-MCNC: 14.6 G/DL (ref 13.3–17.7)
MCH RBC QN AUTO: 31.6 PG (ref 26.5–33)
MCHC RBC AUTO-ENTMCNC: 34.4 G/DL (ref 31.5–36.5)
MCV RBC AUTO: 92 FL (ref 78–100)
PLATELET # BLD AUTO: 339 10E9/L (ref 150–450)
RBC # BLD AUTO: 4.62 10E12/L (ref 4.4–5.9)
WBC # BLD AUTO: 7.3 10E9/L (ref 4–11)

## 2021-02-08 PROCEDURE — 80053 COMPREHEN METABOLIC PANEL: CPT | Performed by: FAMILY MEDICINE

## 2021-02-08 PROCEDURE — 80061 LIPID PANEL: CPT | Performed by: FAMILY MEDICINE

## 2021-02-08 PROCEDURE — 85027 COMPLETE CBC AUTOMATED: CPT | Performed by: FAMILY MEDICINE

## 2021-02-08 PROCEDURE — 36415 COLL VENOUS BLD VENIPUNCTURE: CPT | Performed by: FAMILY MEDICINE

## 2021-02-09 LAB
ALBUMIN SERPL-MCNC: 3.9 G/DL (ref 3.4–5)
ALP SERPL-CCNC: 173 U/L (ref 40–150)
ALT SERPL W P-5'-P-CCNC: 97 U/L (ref 0–70)
ANION GAP SERPL CALCULATED.3IONS-SCNC: 4 MMOL/L (ref 3–14)
AST SERPL W P-5'-P-CCNC: 42 U/L (ref 0–45)
BILIRUB SERPL-MCNC: 0.6 MG/DL (ref 0.2–1.3)
BUN SERPL-MCNC: 9 MG/DL (ref 7–30)
CALCIUM SERPL-MCNC: 10 MG/DL (ref 8.5–10.1)
CHLORIDE SERPL-SCNC: 106 MMOL/L (ref 94–109)
CHOLEST SERPL-MCNC: 249 MG/DL
CO2 SERPL-SCNC: 28 MMOL/L (ref 20–32)
CREAT SERPL-MCNC: 0.8 MG/DL (ref 0.66–1.25)
GFR SERPL CREATININE-BSD FRML MDRD: >90 ML/MIN/{1.73_M2}
GLUCOSE SERPL-MCNC: 79 MG/DL (ref 70–99)
HDLC SERPL-MCNC: 55 MG/DL
LDLC SERPL CALC-MCNC: 172 MG/DL
NONHDLC SERPL-MCNC: 194 MG/DL
POTASSIUM SERPL-SCNC: 4.3 MMOL/L (ref 3.4–5.3)
PROT SERPL-MCNC: 7.7 G/DL (ref 6.8–8.8)
SODIUM SERPL-SCNC: 138 MMOL/L (ref 133–144)
TRIGL SERPL-MCNC: 112 MG/DL

## 2021-02-11 ENCOUNTER — TELEPHONE (OUTPATIENT)
Dept: OTHER | Facility: CLINIC | Age: 41
End: 2021-02-11

## 2021-02-11 NOTE — TELEPHONE ENCOUNTER
[facility]  Post op call note - No Answer    Kahlil Farley was called for an update regarding his recovery.  He underwent a robotic bilateral inguinal hernia repair and umbilical hernia repair by Dr. Ji on 1/19/21.     There was no answer and a message was left requesting a return call if he has any further questions regarding her surgery or recovery.      Kadeem Alberto PA-C  Office: [phone]  Pager: [phone]

## 2021-04-20 DIAGNOSIS — F33.9 EPISODE OF RECURRENT MAJOR DEPRESSIVE DISORDER, UNSPECIFIED DEPRESSION EPISODE SEVERITY (H): ICD-10-CM

## 2021-04-21 RX ORDER — DULOXETIN HYDROCHLORIDE 20 MG/1
CAPSULE, DELAYED RELEASE ORAL
Qty: 90 CAPSULE | Refills: 1 | Status: SHIPPED | OUTPATIENT
Start: 2021-04-21 | End: 2021-10-20

## 2021-10-10 ENCOUNTER — HEALTH MAINTENANCE LETTER (OUTPATIENT)
Age: 41
End: 2021-10-10

## 2021-10-20 DIAGNOSIS — F33.9 EPISODE OF RECURRENT MAJOR DEPRESSIVE DISORDER, UNSPECIFIED DEPRESSION EPISODE SEVERITY (H): ICD-10-CM

## 2021-10-20 RX ORDER — DULOXETIN HYDROCHLORIDE 20 MG/1
CAPSULE, DELAYED RELEASE ORAL
Qty: 90 CAPSULE | Refills: 1 | Status: SHIPPED | OUTPATIENT
Start: 2021-10-20 | End: 2022-04-19

## 2021-10-20 NOTE — TELEPHONE ENCOUNTER
Routing refill request to provider for review/approval because:  Patient needs to be seen because it has been more than 6 months since last office visit.  PHQ-9 score:    PHQ 4/20/2021   PHQ-9 Total Score 4   Q9: Thoughts of better off dead/self-harm past 2 weeks Not at all   Abbie Handy RN

## 2021-11-02 ASSESSMENT — ENCOUNTER SYMPTOMS
FREQUENCY: 0
WEAKNESS: 0
NAUSEA: 0
PALPITATIONS: 0
ARTHRALGIAS: 0
DYSURIA: 0
HEMATURIA: 0
EYE PAIN: 0
HEARTBURN: 0
CONSTIPATION: 0
HEADACHES: 0
SHORTNESS OF BREATH: 0
SORE THROAT: 0
DIARRHEA: 0
HEMATOCHEZIA: 0
MYALGIAS: 0
PARESTHESIAS: 0
NERVOUS/ANXIOUS: 1
FEVER: 0
JOINT SWELLING: 0
CHILLS: 0
ABDOMINAL PAIN: 0
COUGH: 0
DIZZINESS: 0

## 2021-11-08 ENCOUNTER — OFFICE VISIT (OUTPATIENT)
Dept: FAMILY MEDICINE | Facility: CLINIC | Age: 41
End: 2021-11-08
Payer: COMMERCIAL

## 2021-11-08 VITALS
BODY MASS INDEX: 25.34 KG/M2 | RESPIRATION RATE: 12 BRPM | OXYGEN SATURATION: 97 % | TEMPERATURE: 97.7 F | HEART RATE: 60 BPM | SYSTOLIC BLOOD PRESSURE: 128 MMHG | HEIGHT: 71 IN | WEIGHT: 181 LBS | DIASTOLIC BLOOD PRESSURE: 82 MMHG

## 2021-11-08 DIAGNOSIS — Z23 NEED FOR PROPHYLACTIC VACCINATION AND INOCULATION AGAINST INFLUENZA: Primary | ICD-10-CM

## 2021-11-08 DIAGNOSIS — F33.9 EPISODE OF RECURRENT MAJOR DEPRESSIVE DISORDER, UNSPECIFIED DEPRESSION EPISODE SEVERITY (H): ICD-10-CM

## 2021-11-08 DIAGNOSIS — Z00.00 HEALTH MAINTENANCE EXAMINATION: ICD-10-CM

## 2021-11-08 LAB
ERYTHROCYTE [DISTWIDTH] IN BLOOD BY AUTOMATED COUNT: 12.7 % (ref 10–15)
HCT VFR BLD AUTO: 43 % (ref 40–53)
HGB BLD-MCNC: 14.4 G/DL (ref 13.3–17.7)
MCH RBC QN AUTO: 31 PG (ref 26.5–33)
MCHC RBC AUTO-ENTMCNC: 33.5 G/DL (ref 31.5–36.5)
MCV RBC AUTO: 93 FL (ref 78–100)
PLATELET # BLD AUTO: 278 10E3/UL (ref 150–450)
RBC # BLD AUTO: 4.64 10E6/UL (ref 4.4–5.9)
WBC # BLD AUTO: 8.6 10E3/UL (ref 4–11)

## 2021-11-08 PROCEDURE — 36415 COLL VENOUS BLD VENIPUNCTURE: CPT | Performed by: FAMILY MEDICINE

## 2021-11-08 PROCEDURE — 90471 IMMUNIZATION ADMIN: CPT | Performed by: FAMILY MEDICINE

## 2021-11-08 PROCEDURE — 85027 COMPLETE CBC AUTOMATED: CPT | Performed by: FAMILY MEDICINE

## 2021-11-08 PROCEDURE — 80061 LIPID PANEL: CPT | Performed by: FAMILY MEDICINE

## 2021-11-08 PROCEDURE — 99396 PREV VISIT EST AGE 40-64: CPT | Mod: 25 | Performed by: FAMILY MEDICINE

## 2021-11-08 PROCEDURE — 80053 COMPREHEN METABOLIC PANEL: CPT | Performed by: FAMILY MEDICINE

## 2021-11-08 PROCEDURE — 90686 IIV4 VACC NO PRSV 0.5 ML IM: CPT | Performed by: FAMILY MEDICINE

## 2021-11-08 ASSESSMENT — ENCOUNTER SYMPTOMS
ABDOMINAL PAIN: 0
PARESTHESIAS: 0
SORE THROAT: 0
DIZZINESS: 0
CHILLS: 0
HEMATURIA: 0
DYSURIA: 0
WEAKNESS: 0
NERVOUS/ANXIOUS: 1
HEARTBURN: 0
COUGH: 0
PALPITATIONS: 0
FREQUENCY: 0
HEADACHES: 0
NAUSEA: 0
SHORTNESS OF BREATH: 0
FEVER: 0
JOINT SWELLING: 0
EYE PAIN: 0
ARTHRALGIAS: 0
MYALGIAS: 0
HEMATOCHEZIA: 0
CONSTIPATION: 0
DIARRHEA: 0

## 2021-11-08 ASSESSMENT — ANXIETY QUESTIONNAIRES
2. NOT BEING ABLE TO STOP OR CONTROL WORRYING: NOT AT ALL
7. FEELING AFRAID AS IF SOMETHING AWFUL MIGHT HAPPEN: NOT AT ALL
7. FEELING AFRAID AS IF SOMETHING AWFUL MIGHT HAPPEN: NOT AT ALL
6. BECOMING EASILY ANNOYED OR IRRITABLE: NOT AT ALL
GAD7 TOTAL SCORE: 2
4. TROUBLE RELAXING: NOT AT ALL
GAD7 TOTAL SCORE: 2
3. WORRYING TOO MUCH ABOUT DIFFERENT THINGS: SEVERAL DAYS
8. IF YOU CHECKED OFF ANY PROBLEMS, HOW DIFFICULT HAVE THESE MADE IT FOR YOU TO DO YOUR WORK, TAKE CARE OF THINGS AT HOME, OR GET ALONG WITH OTHER PEOPLE?: SOMEWHAT DIFFICULT
1. FEELING NERVOUS, ANXIOUS, OR ON EDGE: SEVERAL DAYS
GAD7 TOTAL SCORE: 2
5. BEING SO RESTLESS THAT IT IS HARD TO SIT STILL: NOT AT ALL

## 2021-11-08 ASSESSMENT — PATIENT HEALTH QUESTIONNAIRE - PHQ9
10. IF YOU CHECKED OFF ANY PROBLEMS, HOW DIFFICULT HAVE THESE PROBLEMS MADE IT FOR YOU TO DO YOUR WORK, TAKE CARE OF THINGS AT HOME, OR GET ALONG WITH OTHER PEOPLE: SOMEWHAT DIFFICULT
SUM OF ALL RESPONSES TO PHQ QUESTIONS 1-9: 5
SUM OF ALL RESPONSES TO PHQ QUESTIONS 1-9: 5

## 2021-11-08 ASSESSMENT — MIFFLIN-ST. JEOR: SCORE: 1743.14

## 2021-11-08 ASSESSMENT — PAIN SCALES - GENERAL: PAINLEVEL: NO PAIN (0)

## 2021-11-08 NOTE — PROGRESS NOTES
SUBJECTIVE:   CC: Kahlil Farley is an 40 year old male who presents for preventative health visit.       Patient has been advised of split billing requirements and indicates understanding: Yes  Healthy Habits:     Getting at least 3 servings of Calcium per day:  Yes    Bi-annual eye exam:  NO    Dental care twice a year:  Yes    Sleep apnea or symptoms of sleep apnea:  None    Diet:  Regular (no restrictions)    Frequency of exercise:  4-5 days/week    Duration of exercise:  15-30 minutes    Taking medications regularly:  Yes    Medication side effects:  None    PHQ-2 Total Score: 1    Additional concerns today:  No    Patient is okay with flu shot. ORDERED.         Today's PHQ-2 Score:   PHQ-2 ( 1999 Pfizer) 11/2/2021   Q1: Little interest or pleasure in doing things 0   Q2: Feeling down, depressed or hopeless 1   PHQ-2 Score 1   Q1: Little interest or pleasure in doing things Not at all   Q2: Feeling down, depressed or hopeless Several days   PHQ-2 Score 1       Abuse: Current or Past(Physical, Sexual or Emotional)- No  Do you feel safe in your environment? Yes    Have you ever done Advance Care Planning? (For example, a Health Directive, POLST, or a discussion with a medical provider or your loved ones about your wishes): No, advance care planning information given to patient to review.  Patient declined advance care planning discussion at this time.    Social History     Tobacco Use     Smoking status: Never Smoker     Smokeless tobacco: Never Used   Substance Use Topics     Alcohol use: Yes     If you drink alcohol do you typically have >3 drinks per day or >7 drinks per week? No    Alcohol Use 11/2/2021   Prescreen: >3 drinks/day or >7 drinks/week? No   No flowsheet data found.    Last PSA: No results found for: PSA    Reviewed orders with patient. Reviewed health maintenance and updated orders accordingly - Yes  BP Readings from Last 3 Encounters:   11/08/21 128/82   01/18/21 112/72   11/23/20 130/76    Wt  "Readings from Last 3 Encounters:   11/08/21 82.1 kg (181 lb)   01/18/21 83.5 kg (184 lb)   11/23/20 81.2 kg (179 lb)                  Patient Active Problem List   Diagnosis     Episode of recurrent major depressive disorder, unspecified depression episode severity (H)     Past Surgical History:   Procedure Laterality Date     ABDOMEN SURGERY  Jan 2021     GENITOURINARY SURGERY  2011    Vasectomy       Social History     Tobacco Use     Smoking status: Never Smoker     Smokeless tobacco: Never Used   Substance Use Topics     Alcohol use: Not Currently     Family History   Problem Relation Age of Onset     Diabetes Mother         Type 2     Depression Mother      Anxiety Disorder Mother      Obesity Mother      Hypertension Father          Current Outpatient Medications   Medication Sig Dispense Refill     DULoxetine (CYMBALTA) 20 MG capsule TAKE ONE CAPSULE BY MOUTH DAILY 90 capsule 1     Allergies   Allergen Reactions     Other [No Clinical Screening - See Comments] Hives     Per pt \"some kind of antibiotic but is unsure which one\"      Recent Labs   Lab Test 02/08/21  1531 06/08/20  1316   * 158*   HDL 55 56   TRIG 112 94   ALT 97* 145*   CR 0.80 0.77   GFRESTIMATED >90 >90   GFRESTBLACK >90 >90   POTASSIUM 4.3 3.8        Reviewed and updated as needed this visit by clinical staff                Reviewed and updated as needed this visit by Provider               Past Medical History:   Diagnosis Date     Depressive disorder 2015      Past Surgical History:   Procedure Laterality Date     ABDOMEN SURGERY  Jan 2021     GENITOURINARY SURGERY  2011    Vasectomy       Review of Systems   Constitutional: Negative for chills and fever.   HENT: Negative for congestion, ear pain, hearing loss and sore throat.    Eyes: Negative for pain and visual disturbance.   Respiratory: Negative for cough and shortness of breath.    Cardiovascular: Negative for chest pain, palpitations and peripheral edema.   Gastrointestinal: " "Negative for abdominal pain, constipation, diarrhea, heartburn, hematochezia and nausea.   Genitourinary: Negative for dysuria, frequency, genital sores, hematuria, impotence, penile discharge and urgency.   Musculoskeletal: Negative for arthralgias, joint swelling and myalgias.   Skin: Negative for rash.   Neurological: Negative for dizziness, weakness, headaches and paresthesias.   Psychiatric/Behavioral: Negative for mood changes. The patient is nervous/anxious.          OBJECTIVE:   /82   Pulse 60   Temp 97.7  F (36.5  C) (Tympanic)   Resp 12   Ht 1.803 m (5' 11\")   Wt 82.1 kg (181 lb)   SpO2 97%   BMI 25.24 kg/m      Physical Exam  GENERAL: healthy, alert and no distress  EYES: Eyes grossly normal to inspection, PERRL and conjunctivae and sclerae normal  HENT: ear canals and TM's normal, nose and mouth without ulcers or lesions  NECK: no adenopathy, no asymmetry, masses, or scars and thyroid normal to palpation  RESP: lungs clear to auscultation - no rales, rhonchi or wheezes  CV: regular rate and rhythm, normal S1 S2, no S3 or S4, no murmur, click or rub, no peripheral edema and peripheral pulses strong  ABDOMEN: soft, nontender, no hepatosplenomegaly, no masses and bowel sounds normal  MS: no gross musculoskeletal defects noted, no edema  SKIN: no suspicious lesions or rashes  NEURO: Normal strength and tone, mentation intact and speech normal  BACK: no CVA tenderness, no paralumbar tenderness  PSYCH: mentation appears normal, affect normal/bright        ASSESSMENT/PLAN:       ICD-10-CM    1. Need for prophylactic vaccination and inoculation against influenza  Z23 INFLUENZA VACCINE IM > 6 MONTHS VALENT IIV4 (AFLURIA/FLUZONE)   2. Episode of recurrent major depressive disorder, unspecified depression episode severity (H)  F33.9    3. Health maintenance examination  Z00.00 CBC with platelets     Comprehensive metabolic panel (BMP + Alb, Alk Phos, ALT, AST, Total. Bili, TP)     Lipid panel reflex to " "direct LDL Fasting       Patient has been advised of split billing requirements and indicates understanding: Yes  COUNSELING:   Reviewed preventive health counseling, as reflected in patient instructions    Estimated body mass index is 25.66 kg/m  as calculated from the following:    Height as of 1/18/21: 1.803 m (5' 11\").    Weight as of 1/18/21: 83.5 kg (184 lb).     Weight management plan: Patient referred to endocrine and/or weight management specialty    He reports that he has never smoked. He has never used smokeless tobacco.      Counseling Resources:  ATP IV Guidelines  Pooled Cohorts Equation Calculator  FRAX Risk Assessment  ICSI Preventive Guidelines  Dietary Guidelines for Americans, 2010  USDA's MyPlate  ASA Prophylaxis  Lung CA Screening    Randall Rodríguez MD  [facility]  "

## 2021-11-09 LAB
ALBUMIN SERPL-MCNC: 3.9 G/DL (ref 3.4–5)
ALP SERPL-CCNC: 145 U/L (ref 40–150)
ALT SERPL W P-5'-P-CCNC: 101 U/L (ref 0–70)
ANION GAP SERPL CALCULATED.3IONS-SCNC: 8 MMOL/L (ref 3–14)
AST SERPL W P-5'-P-CCNC: 42 U/L (ref 0–45)
BILIRUB SERPL-MCNC: 0.8 MG/DL (ref 0.2–1.3)
BUN SERPL-MCNC: 10 MG/DL (ref 7–30)
CALCIUM SERPL-MCNC: 9.5 MG/DL (ref 8.5–10.1)
CHLORIDE BLD-SCNC: 105 MMOL/L (ref 94–109)
CHOLEST SERPL-MCNC: 236 MG/DL
CO2 SERPL-SCNC: 24 MMOL/L (ref 20–32)
CREAT SERPL-MCNC: 0.78 MG/DL (ref 0.66–1.25)
FASTING STATUS PATIENT QL REPORTED: YES
GFR SERPL CREATININE-BSD FRML MDRD: >90 ML/MIN/1.73M2
GLUCOSE BLD-MCNC: 75 MG/DL (ref 70–99)
HDLC SERPL-MCNC: 63 MG/DL
LDLC SERPL CALC-MCNC: 157 MG/DL
NONHDLC SERPL-MCNC: 173 MG/DL
POTASSIUM BLD-SCNC: 3.9 MMOL/L (ref 3.4–5.3)
PROT SERPL-MCNC: 7.7 G/DL (ref 6.8–8.8)
SODIUM SERPL-SCNC: 137 MMOL/L (ref 133–144)
TRIGL SERPL-MCNC: 78 MG/DL

## 2021-11-09 ASSESSMENT — ANXIETY QUESTIONNAIRES: GAD7 TOTAL SCORE: 2

## 2021-11-09 ASSESSMENT — PATIENT HEALTH QUESTIONNAIRE - PHQ9: SUM OF ALL RESPONSES TO PHQ QUESTIONS 1-9: 5

## 2022-04-17 DIAGNOSIS — F33.9 EPISODE OF RECURRENT MAJOR DEPRESSIVE DISORDER, UNSPECIFIED DEPRESSION EPISODE SEVERITY (H): ICD-10-CM

## 2022-04-19 RX ORDER — DULOXETIN HYDROCHLORIDE 20 MG/1
CAPSULE, DELAYED RELEASE ORAL
Qty: 90 CAPSULE | Refills: 1 | Status: SHIPPED | OUTPATIENT
Start: 2022-04-19 | End: 2022-11-09

## 2022-04-19 NOTE — TELEPHONE ENCOUNTER
"Requested Prescriptions   Pending Prescriptions Disp Refills    DULoxetine (CYMBALTA) 20 MG capsule [Pharmacy Med Name: DULOXETINE DR 20MG CAPSULES] 90 capsule 1     Sig: TAKE ONE CAPSULE BY MOUTH DAILY        Serotonin-Norepinephrine Reuptake Inhibitors  Failed - 4/17/2022  4:06 AM        Failed - PHQ-9 score of less than 5 in past 6 months     Please review last PHQ-9 score.           Passed - Blood pressure under 140/90 in past 12 months       BP Readings from Last 3 Encounters:   11/08/21 128/82   01/18/21 112/72   11/23/20 130/76                 Passed - Medication is active on med list        Passed - Patient is age 18 or older        Passed - Recent (6 mo) or future (30 days) visit within the authorizing provider's specialty     Patient had office visit in the last 6 months or has a visit in the next 30 days with authorizing provider or within the authorizing provider's specialty.  See \"Patient Info\" tab in inbasket, or \"Choose Columns\" in Meds & Orders section of the refill encounter.                  "

## 2022-09-24 ENCOUNTER — HEALTH MAINTENANCE LETTER (OUTPATIENT)
Age: 42
End: 2022-09-24

## 2022-11-06 DIAGNOSIS — F33.9 EPISODE OF RECURRENT MAJOR DEPRESSIVE DISORDER, UNSPECIFIED DEPRESSION EPISODE SEVERITY (H): ICD-10-CM

## 2022-11-09 RX ORDER — DULOXETIN HYDROCHLORIDE 20 MG/1
CAPSULE, DELAYED RELEASE ORAL
Qty: 90 CAPSULE | Refills: 0 | Status: SHIPPED | OUTPATIENT
Start: 2022-11-09 | End: 2023-02-10

## 2022-11-09 NOTE — TELEPHONE ENCOUNTER
Left message for patient to call clinic to schedule appointment.     Jes SalazarRN  [facility]   '

## 2022-11-09 NOTE — TELEPHONE ENCOUNTER
Routing refill request to provider for review/approval because:  Drug not on the [facility] refill protocol .   Serotonin-Norepinephrine Reuptake Inhibitors  Failed 11/06/2022 04:37 PM   Protocol Details  PHQ-9 score of less than 5 in past 6 months       Left phone message asking patient to schedule appointment.     Jes SalazarRN  [facility] Triage

## 2023-01-05 ASSESSMENT — ENCOUNTER SYMPTOMS
HEMATOCHEZIA: 0
PALPITATIONS: 0
NERVOUS/ANXIOUS: 0
COUGH: 0
DIARRHEA: 0
EYE PAIN: 0
FREQUENCY: 0
ABDOMINAL PAIN: 0
DIZZINESS: 0
HEARTBURN: 0
SHORTNESS OF BREATH: 0
WEAKNESS: 0
NAUSEA: 1
JOINT SWELLING: 0
CONSTIPATION: 0
HEMATURIA: 0
FEVER: 0
PARESTHESIAS: 0
HEADACHES: 1
MYALGIAS: 0
CHILLS: 0
DYSURIA: 0
ARTHRALGIAS: 0
SORE THROAT: 0

## 2023-01-05 NOTE — PROGRESS NOTES
SUBJECTIVE:   CC: Kahlil is an 42 year old who presents for preventative health visit.     Patient has been advised of split billing requirements and indicates understanding: Yes  Healthy Habits:     Getting at least 3 servings of Calcium per day:  Yes    Bi-annual eye exam:  NO    Dental care twice a year:  Yes    Sleep apnea or symptoms of sleep apnea:  None    Diet:  Breakfast skipped    Frequency of exercise:  2-3 days/week    Duration of exercise:  30-45 minutes    Taking medications regularly:  Yes    Medication side effects:  None    PHQ-2 Total Score: 1    Additional concerns today:  No    Today's PHQ-2 Score:   PHQ-2 ( 1999 Pfizer) 11/2/2021   Q1: Little interest or pleasure in doing things 0   Q2: Feeling down, depressed or hopeless 1   PHQ-2 Score 1   PHQ-2 Total Score (12-17 Years)- Positive if 3 or more points; Administer PHQ-A if positive 1   Q1: Little interest or pleasure in doing things Not at all   Q2: Feeling down, depressed or hopeless Several days   PHQ-2 Score 1           Social History     Tobacco Use     Smoking status: Never     Smokeless tobacco: Never   Substance Use Topics     Alcohol use: Not Currently     If you drink alcohol do you typically have >3 drinks per day or >7 drinks per week? No      Last PSA: No results found for: PSA    Reviewed orders with patient. Reviewed health maintenance and updated orders accordingly - Yes  BP Readings from Last 3 Encounters:   01/06/23 128/82   11/08/21 128/82   01/18/21 112/72    Wt Readings from Last 3 Encounters:   01/06/23 93.9 kg (207 lb)   11/08/21 82.1 kg (181 lb)   01/18/21 83.5 kg (184 lb)                  Patient Active Problem List   Diagnosis     Episode of recurrent major depressive disorder, unspecified depression episode severity (H)     Past Surgical History:   Procedure Laterality Date     ABDOMEN SURGERY  Jan 2021     GENITOURINARY SURGERY  2011    Vasectomy       Social History     Tobacco Use     Smoking status: Never      "Smokeless tobacco: Never   Substance Use Topics     Alcohol use: Not Currently     Family History   Problem Relation Age of Onset     Diabetes Mother         Type 2     Depression Mother      Anxiety Disorder Mother      Obesity Mother      Hypertension Father          Current Outpatient Medications   Medication Sig Dispense Refill     DULoxetine (CYMBALTA) 20 MG capsule TAKE 1 CAPSULE BY MOUTH DAILY 90 capsule 0     Allergies   Allergen Reactions     Other [No Clinical Screening - See Comments] Hives     Per pt \"some kind of antibiotic but is unsure which one\"      Recent Labs   Lab Test 11/08/21  1702 02/08/21  1531 06/08/20  1316   * 172* 158*   HDL 63 55 56   TRIG 78 112 94   * 97* 145*   CR 0.78 0.80 0.77   GFRESTIMATED >90 >90 >90   GFRESTBLACK  --  >90 >90   POTASSIUM 3.9 4.3 3.8        Reviewed and updated as needed this visit by clinical staff                  Reviewed and updated as needed this visit by Provider                 Past Medical History:   Diagnosis Date     Depressive disorder 2015      Past Surgical History:   Procedure Laterality Date     ABDOMEN SURGERY  Jan 2021     GENITOURINARY SURGERY  2011    Vasectomy       Review of Systems   Constitutional: Negative for chills and fever.   HENT: Negative for congestion, ear pain, hearing loss and sore throat.    Eyes: Negative for pain and visual disturbance.   Respiratory: Negative for cough and shortness of breath.    Cardiovascular: Negative for chest pain, palpitations and peripheral edema.   Gastrointestinal: Positive for nausea. Negative for abdominal pain, constipation, diarrhea, heartburn and hematochezia.   Genitourinary: Negative for dysuria, frequency, genital sores, hematuria, impotence, penile discharge and urgency.   Musculoskeletal: Negative for arthralgias, joint swelling and myalgias.   Skin: Negative for rash.   Neurological: Positive for headaches. Negative for dizziness, weakness and paresthesias. " "  Psychiatric/Behavioral: Negative for mood changes. The patient is not nervous/anxious.          OBJECTIVE:   /82   Pulse 67   Temp 98  F (36.7  C) (Temporal)   Resp 15   Ht 1.803 m (5' 11\")   Wt 93.9 kg (207 lb)   SpO2 97%   BMI 28.87 kg/m      Physical Exam  GENERAL: healthy, alert and no distress  NECK: no adenopathy, no asymmetry, masses, or scars and thyroid normal to palpation  RESP: lungs clear to auscultation - no rales, rhonchi or wheezes  CV: regular rate and rhythm, normal S1 S2, no S3 or S4, no murmur, click or rub, no peripheral edema and peripheral pulses strong  ABDOMEN: soft, nontender, no hepatosplenomegaly, no masses and bowel sounds normal  MS: no gross musculoskeletal defects noted, no edema        ASSESSMENT/PLAN:       ICD-10-CM    1. Healthcare maintenance  Z00.00 CBC with platelets     Comprehensive metabolic panel (BMP + Alb, Alk Phos, ALT, AST, Total. Bili, TP)     Lipid panel reflex to direct LDL Fasting          Patient has been advised of split billing requirements and indicates understanding: Yes      COUNSELING:   Reviewed preventive health counseling, as reflected in patient instructions        He reports that he has never smoked. He has never used smokeless tobacco.            Randall Rodríguez MD  [facility] [place]  Answers for HPI/ROS submitted by the patient on 1/6/2023  If you checked off any problems, how difficult have these problems made it for you to do your work, take care of things at home, or get along with other people?: Somewhat difficult  PHQ9 TOTAL SCORE: 3      "

## 2023-01-06 ENCOUNTER — OFFICE VISIT (OUTPATIENT)
Dept: FAMILY MEDICINE | Facility: CLINIC | Age: 43
End: 2023-01-06
Payer: COMMERCIAL

## 2023-01-06 VITALS
BODY MASS INDEX: 28.98 KG/M2 | RESPIRATION RATE: 15 BRPM | TEMPERATURE: 98 F | OXYGEN SATURATION: 97 % | DIASTOLIC BLOOD PRESSURE: 82 MMHG | HEIGHT: 71 IN | HEART RATE: 67 BPM | SYSTOLIC BLOOD PRESSURE: 128 MMHG | WEIGHT: 207 LBS

## 2023-01-06 DIAGNOSIS — Z00.00 HEALTHCARE MAINTENANCE: Primary | ICD-10-CM

## 2023-01-06 DIAGNOSIS — E78.2 MIXED HYPERLIPIDEMIA: ICD-10-CM

## 2023-01-06 DIAGNOSIS — Z23 HIGH PRIORITY FOR 2019-NCOV VACCINE: ICD-10-CM

## 2023-01-06 LAB
ALBUMIN SERPL BCG-MCNC: 4.4 G/DL (ref 3.5–5.2)
ALP SERPL-CCNC: 146 U/L (ref 40–129)
ALT SERPL W P-5'-P-CCNC: 80 U/L (ref 10–50)
ANION GAP SERPL CALCULATED.3IONS-SCNC: 13 MMOL/L (ref 7–15)
AST SERPL W P-5'-P-CCNC: 50 U/L (ref 10–50)
BILIRUB SERPL-MCNC: 0.5 MG/DL
BUN SERPL-MCNC: 9 MG/DL (ref 6–20)
CALCIUM SERPL-MCNC: 9.5 MG/DL (ref 8.6–10)
CHLORIDE SERPL-SCNC: 104 MMOL/L (ref 98–107)
CHOLEST SERPL-MCNC: 284 MG/DL
CREAT SERPL-MCNC: 0.79 MG/DL (ref 0.67–1.17)
DEPRECATED HCO3 PLAS-SCNC: 22 MMOL/L (ref 22–29)
ERYTHROCYTE [DISTWIDTH] IN BLOOD BY AUTOMATED COUNT: 11.9 % (ref 10–15)
GFR SERPL CREATININE-BSD FRML MDRD: >90 ML/MIN/1.73M2
GLUCOSE SERPL-MCNC: 87 MG/DL (ref 70–99)
HCT VFR BLD AUTO: 44.1 % (ref 40–53)
HDLC SERPL-MCNC: 51 MG/DL
HGB BLD-MCNC: 14.4 G/DL (ref 13.3–17.7)
LDLC SERPL CALC-MCNC: 192 MG/DL
MCH RBC QN AUTO: 31.4 PG (ref 26.5–33)
MCHC RBC AUTO-ENTMCNC: 32.7 G/DL (ref 31.5–36.5)
MCV RBC AUTO: 96 FL (ref 78–100)
NONHDLC SERPL-MCNC: 233 MG/DL
PLATELET # BLD AUTO: 255 10E3/UL (ref 150–450)
POTASSIUM SERPL-SCNC: 4.5 MMOL/L (ref 3.4–5.3)
PROT SERPL-MCNC: 7 G/DL (ref 6.4–8.3)
RBC # BLD AUTO: 4.58 10E6/UL (ref 4.4–5.9)
SODIUM SERPL-SCNC: 139 MMOL/L (ref 136–145)
TRIGL SERPL-MCNC: 207 MG/DL
WBC # BLD AUTO: 5.2 10E3/UL (ref 4–11)

## 2023-01-06 PROCEDURE — 80053 COMPREHEN METABOLIC PANEL: CPT | Performed by: FAMILY MEDICINE

## 2023-01-06 PROCEDURE — 0124A COVID-19 VACCINE BIVALENT BOOSTER 12+ (PFIZER): CPT | Performed by: FAMILY MEDICINE

## 2023-01-06 PROCEDURE — 85027 COMPLETE CBC AUTOMATED: CPT | Performed by: FAMILY MEDICINE

## 2023-01-06 PROCEDURE — 99396 PREV VISIT EST AGE 40-64: CPT | Performed by: FAMILY MEDICINE

## 2023-01-06 PROCEDURE — 80061 LIPID PANEL: CPT | Performed by: FAMILY MEDICINE

## 2023-01-06 PROCEDURE — 91312 COVID-19 VACCINE BIVALENT BOOSTER 12+ (PFIZER): CPT | Performed by: FAMILY MEDICINE

## 2023-01-06 PROCEDURE — 36415 COLL VENOUS BLD VENIPUNCTURE: CPT | Performed by: FAMILY MEDICINE

## 2023-01-06 RX ORDER — ROSUVASTATIN CALCIUM 20 MG/1
20 TABLET, COATED ORAL DAILY
Qty: 90 TABLET | Refills: 1 | Status: SHIPPED | OUTPATIENT
Start: 2023-01-06 | End: 2023-03-03

## 2023-01-06 ASSESSMENT — PAIN SCALES - GENERAL: PAINLEVEL: NO PAIN (0)

## 2023-01-06 ASSESSMENT — ENCOUNTER SYMPTOMS
ARTHRALGIAS: 0
NERVOUS/ANXIOUS: 0
FEVER: 0
SORE THROAT: 0
HEARTBURN: 0
EYE PAIN: 0
FREQUENCY: 0
HEMATOCHEZIA: 0
DIZZINESS: 0
DIARRHEA: 0
MYALGIAS: 0
WEAKNESS: 0
JOINT SWELLING: 0
DYSURIA: 0
NAUSEA: 1
PARESTHESIAS: 0
PALPITATIONS: 0
COUGH: 0
CHILLS: 0
HEADACHES: 1
CONSTIPATION: 0
ABDOMINAL PAIN: 0
HEMATURIA: 0
SHORTNESS OF BREATH: 0

## 2023-01-06 ASSESSMENT — PATIENT HEALTH QUESTIONNAIRE - PHQ9
10. IF YOU CHECKED OFF ANY PROBLEMS, HOW DIFFICULT HAVE THESE PROBLEMS MADE IT FOR YOU TO DO YOUR WORK, TAKE CARE OF THINGS AT HOME, OR GET ALONG WITH OTHER PEOPLE: SOMEWHAT DIFFICULT
SUM OF ALL RESPONSES TO PHQ QUESTIONS 1-9: 3
SUM OF ALL RESPONSES TO PHQ QUESTIONS 1-9: 3

## 2023-02-10 DIAGNOSIS — F33.9 EPISODE OF RECURRENT MAJOR DEPRESSIVE DISORDER, UNSPECIFIED DEPRESSION EPISODE SEVERITY (H): ICD-10-CM

## 2023-02-10 RX ORDER — DULOXETIN HYDROCHLORIDE 20 MG/1
CAPSULE, DELAYED RELEASE ORAL
Qty: 90 CAPSULE | Refills: 1 | Status: SHIPPED | OUTPATIENT
Start: 2023-02-10 | End: 2023-03-28

## 2023-03-02 DIAGNOSIS — E78.2 MIXED HYPERLIPIDEMIA: ICD-10-CM

## 2023-03-02 NOTE — TELEPHONE ENCOUNTER
Medication Question or Refill        What medication are you calling about (include dose and sig)?: rosuvastatin (CRESTOR) 20 MG tablet       Preferred Pharmacy:    [facility]      Controlled Substance Agreement on file:   CSA -- Patient Level:    CSA: None found at the patient level.       Who prescribed the medication?: Rodríguez    Do you need a refill? Yes    When did you use the medication last? unknown    Patient offered an appointment? No    Do you have any questions or concerns?  No    Received fax from pharmacy.    Drea MAHONEY    [facility]

## 2023-03-03 RX ORDER — ROSUVASTATIN CALCIUM 20 MG/1
20 TABLET, COATED ORAL DAILY
Qty: 90 TABLET | Refills: 1 | Status: SHIPPED | OUTPATIENT
Start: 2023-03-03 | End: 2023-08-07

## 2023-03-27 DIAGNOSIS — F33.9 EPISODE OF RECURRENT MAJOR DEPRESSIVE DISORDER, UNSPECIFIED DEPRESSION EPISODE SEVERITY (H): ICD-10-CM

## 2023-03-28 RX ORDER — DULOXETIN HYDROCHLORIDE 20 MG/1
20 CAPSULE, DELAYED RELEASE ORAL DAILY
Qty: 90 CAPSULE | Refills: 0 | Status: SHIPPED | OUTPATIENT
Start: 2023-03-28 | End: 2023-04-12

## 2023-04-12 ENCOUNTER — MYC MEDICAL ADVICE (OUTPATIENT)
Dept: FAMILY MEDICINE | Facility: CLINIC | Age: 43
End: 2023-04-12
Payer: COMMERCIAL

## 2023-04-12 DIAGNOSIS — F33.9 EPISODE OF RECURRENT MAJOR DEPRESSIVE DISORDER, UNSPECIFIED DEPRESSION EPISODE SEVERITY (H): ICD-10-CM

## 2023-04-12 RX ORDER — DULOXETIN HYDROCHLORIDE 20 MG/1
20 CAPSULE, DELAYED RELEASE ORAL DAILY
Qty: 20 CAPSULE | Refills: 0 | Status: SHIPPED | OUTPATIENT
Start: 2023-04-12 | End: 2023-06-19

## 2023-04-12 NOTE — TELEPHONE ENCOUNTER
Please see [facility] and advise. Pt requesting short supply of rx.    Kalpana KRISHNAN RN  [facility] Triage Team

## 2023-06-18 DIAGNOSIS — F33.9 EPISODE OF RECURRENT MAJOR DEPRESSIVE DISORDER, UNSPECIFIED DEPRESSION EPISODE SEVERITY (H): ICD-10-CM

## 2023-06-19 RX ORDER — DULOXETIN HYDROCHLORIDE 20 MG/1
CAPSULE, DELAYED RELEASE ORAL
Qty: 90 CAPSULE | Refills: 0 | Status: SHIPPED | OUTPATIENT
Start: 2023-06-19 | End: 2023-08-29

## 2023-07-01 NOTE — TELEPHONE ENCOUNTER
I might need to start with what GGT is as below.   GGT is present in the serum of healthy individuals. Elevated serum activity is found in diseases of the liver, biliary tract, and pancreas. However, an elevation in serum GGT is not completely specific for hepatobiliary disease.  In these settings, there is no correlation between the serum GGT and alkaline phosphatase.  An isolated elevation in serum GGT or a GGT elevation out of proportion to that of other enzymes (such as the alkaline phosphatase(ALP) and alanine aminotransferase(ALT)) may be an indicator of alcoholic liver disease. The reasons for this are not well understood. Although hepatic microsomal GGT may be induced by alcohol, neither elevated serum GGT nor a history or recent alcohol ingestion correlates with hepatic GGT activity in patients with biopsy-proven alcoholic liver disease.   Aside from its value in conferring liver specificity to an elevated serum alkaline phosphatase level and its possible use in identifying patients with alcohol abuse, serum GGT offers no advantage over aminotransferases and alkaline phosphatase.     So, his normal Alkaline phosphatase with mildly elevated ALT and AST are not indicated checking GGT to rule out alcoholic liver disease     FYI for him, Since he has long term history of elevated AST and ALT, we might need to consider involving hepatologist for in depth study including liver biopsy to rule out early finding of GIRARD(nonalcoholic steatohepatitis).     Please let him know       thx   10

## 2023-08-07 DIAGNOSIS — E78.2 MIXED HYPERLIPIDEMIA: ICD-10-CM

## 2023-08-07 RX ORDER — ROSUVASTATIN CALCIUM 20 MG/1
20 TABLET, COATED ORAL DAILY
Qty: 90 TABLET | Refills: 3 | Status: SHIPPED | OUTPATIENT
Start: 2023-08-07 | End: 2024-09-27

## 2023-08-29 DIAGNOSIS — F33.9 EPISODE OF RECURRENT MAJOR DEPRESSIVE DISORDER, UNSPECIFIED DEPRESSION EPISODE SEVERITY (H): ICD-10-CM

## 2023-08-29 RX ORDER — DULOXETIN HYDROCHLORIDE 20 MG/1
CAPSULE, DELAYED RELEASE ORAL
Qty: 90 CAPSULE | Refills: 3 | Status: SHIPPED | OUTPATIENT
Start: 2023-08-29 | End: 2024-09-28

## 2023-12-08 ENCOUNTER — PATIENT OUTREACH (OUTPATIENT)
Dept: CARE COORDINATION | Facility: CLINIC | Age: 43
End: 2023-12-08
Payer: COMMERCIAL

## 2023-12-22 ENCOUNTER — PATIENT OUTREACH (OUTPATIENT)
Dept: CARE COORDINATION | Facility: CLINIC | Age: 43
End: 2023-12-22
Payer: COMMERCIAL

## 2024-01-04 ENCOUNTER — PATIENT OUTREACH (OUTPATIENT)
Dept: CARE COORDINATION | Facility: CLINIC | Age: 44
End: 2024-01-04
Payer: COMMERCIAL

## 2024-01-18 ENCOUNTER — PATIENT OUTREACH (OUTPATIENT)
Dept: CARE COORDINATION | Facility: CLINIC | Age: 44
End: 2024-01-18
Payer: COMMERCIAL

## 2024-02-25 ENCOUNTER — HEALTH MAINTENANCE LETTER (OUTPATIENT)
Age: 44
End: 2024-02-25

## 2024-09-27 ENCOUNTER — TELEPHONE (OUTPATIENT)
Dept: FAMILY MEDICINE | Facility: CLINIC | Age: 44
End: 2024-09-27
Payer: COMMERCIAL

## 2024-09-27 DIAGNOSIS — E78.2 MIXED HYPERLIPIDEMIA: ICD-10-CM

## 2024-09-27 RX ORDER — ROSUVASTATIN CALCIUM 20 MG/1
20 TABLET, COATED ORAL DAILY
Qty: 90 TABLET | Refills: 0 | Status: SHIPPED | OUTPATIENT
Start: 2024-09-27

## 2024-09-28 ENCOUNTER — MYC REFILL (OUTPATIENT)
Dept: FAMILY MEDICINE | Facility: CLINIC | Age: 44
End: 2024-09-28
Payer: COMMERCIAL

## 2024-09-28 DIAGNOSIS — E78.2 MIXED HYPERLIPIDEMIA: ICD-10-CM

## 2024-09-28 DIAGNOSIS — F33.9 EPISODE OF RECURRENT MAJOR DEPRESSIVE DISORDER, UNSPECIFIED DEPRESSION EPISODE SEVERITY (H): ICD-10-CM

## 2024-09-30 RX ORDER — DULOXETIN HYDROCHLORIDE 20 MG/1
20 CAPSULE, DELAYED RELEASE ORAL DAILY
Qty: 90 CAPSULE | Refills: 3 | Status: SHIPPED | OUTPATIENT
Start: 2024-09-30

## 2024-09-30 RX ORDER — ROSUVASTATIN CALCIUM 20 MG/1
20 TABLET, COATED ORAL DAILY
Qty: 90 TABLET | Refills: 0 | OUTPATIENT
Start: 2024-09-30

## 2024-10-03 NOTE — TELEPHONE ENCOUNTER
Patient seen my chart message Last read by Kahlil Farley at  8:06 AM on 9/28/2024. No appointment made yet

## 2025-03-03 ENCOUNTER — OFFICE VISIT (OUTPATIENT)
Dept: FAMILY MEDICINE | Facility: CLINIC | Age: 45
End: 2025-03-03
Payer: COMMERCIAL

## 2025-03-03 VITALS
SYSTOLIC BLOOD PRESSURE: 118 MMHG | BODY MASS INDEX: 26.43 KG/M2 | TEMPERATURE: 97.8 F | DIASTOLIC BLOOD PRESSURE: 73 MMHG | HEIGHT: 71 IN | HEART RATE: 58 BPM | OXYGEN SATURATION: 99 % | WEIGHT: 188.8 LBS | RESPIRATION RATE: 16 BRPM

## 2025-03-03 DIAGNOSIS — Z00.00 HEALTHCARE MAINTENANCE: Primary | ICD-10-CM

## 2025-03-03 DIAGNOSIS — E78.2 MIXED HYPERLIPIDEMIA: ICD-10-CM

## 2025-03-03 DIAGNOSIS — Z12.11 SCREEN FOR COLON CANCER: ICD-10-CM

## 2025-03-03 LAB
ALBUMIN SERPL BCG-MCNC: 4.4 G/DL (ref 3.5–5.2)
ALP SERPL-CCNC: 126 U/L (ref 40–150)
ALT SERPL W P-5'-P-CCNC: 39 U/L (ref 0–70)
ANION GAP SERPL CALCULATED.3IONS-SCNC: 11 MMOL/L (ref 7–15)
AST SERPL W P-5'-P-CCNC: 27 U/L (ref 0–45)
BILIRUB SERPL-MCNC: 0.5 MG/DL
BUN SERPL-MCNC: 9.2 MG/DL (ref 6–20)
CALCIUM SERPL-MCNC: 9.5 MG/DL (ref 8.8–10.4)
CHLORIDE SERPL-SCNC: 106 MMOL/L (ref 98–107)
CHOLEST SERPL-MCNC: 123 MG/DL
CREAT SERPL-MCNC: 0.81 MG/DL (ref 0.67–1.17)
EGFRCR SERPLBLD CKD-EPI 2021: >90 ML/MIN/1.73M2
ERYTHROCYTE [DISTWIDTH] IN BLOOD BY AUTOMATED COUNT: 11.9 % (ref 10–15)
FASTING STATUS PATIENT QL REPORTED: NO
FASTING STATUS PATIENT QL REPORTED: NO
GLUCOSE SERPL-MCNC: 82 MG/DL (ref 70–99)
HCO3 SERPL-SCNC: 24 MMOL/L (ref 22–29)
HCT VFR BLD AUTO: 38.6 % (ref 40–53)
HDLC SERPL-MCNC: 47 MG/DL
HGB BLD-MCNC: 13.5 G/DL (ref 13.3–17.7)
LDLC SERPL CALC-MCNC: 65 MG/DL
MCH RBC QN AUTO: 31.7 PG (ref 26.5–33)
MCHC RBC AUTO-ENTMCNC: 35 G/DL (ref 31.5–36.5)
MCV RBC AUTO: 91 FL (ref 78–100)
NONHDLC SERPL-MCNC: 76 MG/DL
PLATELET # BLD AUTO: 283 10E3/UL (ref 150–450)
POTASSIUM SERPL-SCNC: 3.8 MMOL/L (ref 3.4–5.3)
PROT SERPL-MCNC: 6.7 G/DL (ref 6.4–8.3)
RBC # BLD AUTO: 4.26 10E6/UL (ref 4.4–5.9)
SODIUM SERPL-SCNC: 141 MMOL/L (ref 135–145)
TRIGL SERPL-MCNC: 57 MG/DL
WBC # BLD AUTO: 7.2 10E3/UL (ref 4–11)

## 2025-03-03 PROCEDURE — 85027 COMPLETE CBC AUTOMATED: CPT | Performed by: FAMILY MEDICINE

## 2025-03-03 PROCEDURE — 99213 OFFICE O/P EST LOW 20 MIN: CPT | Mod: 25 | Performed by: FAMILY MEDICINE

## 2025-03-03 PROCEDURE — 80061 LIPID PANEL: CPT | Performed by: FAMILY MEDICINE

## 2025-03-03 PROCEDURE — 99396 PREV VISIT EST AGE 40-64: CPT | Mod: 25 | Performed by: FAMILY MEDICINE

## 2025-03-03 PROCEDURE — 1126F AMNT PAIN NOTED NONE PRSNT: CPT | Performed by: FAMILY MEDICINE

## 2025-03-03 PROCEDURE — 36415 COLL VENOUS BLD VENIPUNCTURE: CPT | Performed by: FAMILY MEDICINE

## 2025-03-03 PROCEDURE — 90715 TDAP VACCINE 7 YRS/> IM: CPT | Performed by: FAMILY MEDICINE

## 2025-03-03 PROCEDURE — 3074F SYST BP LT 130 MM HG: CPT | Performed by: FAMILY MEDICINE

## 2025-03-03 PROCEDURE — 3078F DIAST BP <80 MM HG: CPT | Performed by: FAMILY MEDICINE

## 2025-03-03 PROCEDURE — 80053 COMPREHEN METABOLIC PANEL: CPT | Performed by: FAMILY MEDICINE

## 2025-03-03 PROCEDURE — G2211 COMPLEX E/M VISIT ADD ON: HCPCS | Performed by: FAMILY MEDICINE

## 2025-03-03 PROCEDURE — 90471 IMMUNIZATION ADMIN: CPT | Performed by: FAMILY MEDICINE

## 2025-03-03 RX ORDER — ROSUVASTATIN CALCIUM 20 MG/1
20 TABLET, COATED ORAL DAILY
Qty: 90 TABLET | Refills: 3 | Status: SHIPPED | OUTPATIENT
Start: 2025-03-03

## 2025-03-03 SDOH — HEALTH STABILITY: PHYSICAL HEALTH: ON AVERAGE, HOW MANY DAYS PER WEEK DO YOU ENGAGE IN MODERATE TO STRENUOUS EXERCISE (LIKE A BRISK WALK)?: 2 DAYS

## 2025-03-03 ASSESSMENT — PATIENT HEALTH QUESTIONNAIRE - PHQ9
SUM OF ALL RESPONSES TO PHQ QUESTIONS 1-9: 7
5. POOR APPETITE OR OVEREATING: NOT AT ALL
SUM OF ALL RESPONSES TO PHQ QUESTIONS 1-9: 7
10. IF YOU CHECKED OFF ANY PROBLEMS, HOW DIFFICULT HAVE THESE PROBLEMS MADE IT FOR YOU TO DO YOUR WORK, TAKE CARE OF THINGS AT HOME, OR GET ALONG WITH OTHER PEOPLE: SOMEWHAT DIFFICULT

## 2025-03-03 ASSESSMENT — ANXIETY QUESTIONNAIRES
IF YOU CHECKED OFF ANY PROBLEMS ON THIS QUESTIONNAIRE, HOW DIFFICULT HAVE THESE PROBLEMS MADE IT FOR YOU TO DO YOUR WORK, TAKE CARE OF THINGS AT HOME, OR GET ALONG WITH OTHER PEOPLE: SOMEWHAT DIFFICULT
GAD7 TOTAL SCORE: 5
GAD7 TOTAL SCORE: 5
6. BECOMING EASILY ANNOYED OR IRRITABLE: SEVERAL DAYS
7. FEELING AFRAID AS IF SOMETHING AWFUL MIGHT HAPPEN: NOT AT ALL
1. FEELING NERVOUS, ANXIOUS, OR ON EDGE: MORE THAN HALF THE DAYS
2. NOT BEING ABLE TO STOP OR CONTROL WORRYING: SEVERAL DAYS
3. WORRYING TOO MUCH ABOUT DIFFERENT THINGS: SEVERAL DAYS
5. BEING SO RESTLESS THAT IT IS HARD TO SIT STILL: NOT AT ALL

## 2025-03-03 ASSESSMENT — SOCIAL DETERMINANTS OF HEALTH (SDOH): HOW OFTEN DO YOU GET TOGETHER WITH FRIENDS OR RELATIVES?: ONCE A WEEK

## 2025-03-03 ASSESSMENT — PAIN SCALES - GENERAL: PAINLEVEL_OUTOF10: NO PAIN (0)

## 2025-03-03 NOTE — PROGRESS NOTES
"Preventive Care Visit  [facility] OSMEL Rodríguez MD, Family Medicine  Mar 3, 2025      Assessment & Plan     Mixed hyperlipidemia  Stable   Keep monitoring   Will review the lab and update pt  - Lipid panel reflex to direct LDL Non-fasting; Future  - rosuvastatin (CRESTOR) 20 MG tablet; Take 1 tablet (20 mg) by mouth daily.    Screen for colon cancer    - Colonoscopy Screening  Referral; Future    Healthcare maintenance    - Lipid panel reflex to direct LDL Non-fasting; Future  - Colonoscopy Screening  Referral; Future  - CBC with platelets; Future  - Comprehensive metabolic panel (BMP + Alb, Alk Phos, ALT, AST, Total. Bili, TP); Future    Patient has been advised of split billing requirements and indicates understanding: Yes        BMI  Estimated body mass index is 26.43 kg/m  as calculated from the following:    Height as of this encounter: 1.8 m (5' 10.87\").    Weight as of this encounter: 85.6 kg (188 lb 12.8 oz).   Weight management plan: Discussed healthy diet and exercise guidelines    Counseling  Appropriate preventive services were addressed with this patient via screening, questionnaire, or discussion as appropriate for fall prevention, nutrition, physical activity, Tobacco-use cessation, social engagement, weight loss and cognition.  Checklist reviewing preventive services available has been given to the patient.  Reviewed patient's diet, addressing concerns and/or questions.   He is at risk for lack of exercise and has been provided with information to increase physical activity for the benefit of his well-being.   He is at risk for psychosocial distress and has been provided with information to reduce risk.   The patient's PHQ-9 score is consistent with mild depression. He was provided with information regarding depression.       FUTURE APPOINTMENTS:       - Follow-up visit in 1 year     [place]   Kahlil is a 44 year old, presenting for the " following:  Physical (Non fasting. )        3/3/2025     3:23 PM   Additional Questions   Roomed by Marli SANCHEZ       Hyperlipidemia Follow-Up    Are you regularly taking any medication or supplement to lower your cholesterol?   Yes- statin  Are you having muscle aches or other side effects that you think could be caused by your cholesterol lowering medication?  No    Advance Care Planning  Patient does not have a Health Care Directive: Patient states has Advance Directive and will bring in a copy to clinic.      3/3/2025   General Health   How would you rate your overall physical health? Good   Feel stress (tense, anxious, or unable to sleep) Rather much   (!) STRESS CONCERN      3/3/2025   Nutrition   Three or more servings of calcium each day? (!) I DON'T KNOW   Diet: Breakfast skipped    Other   If other, please elaborate: one meal per day usually dinner   How many servings of fruit and vegetables per day? (!) 2-3   How many sweetened beverages each day? 0-1       Multiple values from one day are sorted in reverse-chronological order         3/3/2025   Exercise   Days per week of moderate/strenous exercise 2 days   (!) EXERCISE CONCERN      3/3/2025   Social Factors   Frequency of gathering with friends or relatives Once a week   Worry food won't last until get money to buy more No   Food not last or not have enough money for food? No   Do you have housing? (Housing is defined as stable permanent housing and does not include staying ouside in a car, in a tent, in an abandoned building, in an overnight shelter, or couch-surfing.) Yes   Are you worried about losing your housing? No   Lack of transportation? No   Unable to get utilities (heat,electricity)? No         3/3/2025   Dental   Dentist two times every year? Yes          Today's PHQ-9 Score:       3/3/2025     3:19 PM   PHQ-9 SCORE   PHQ-9 Total Score MyChart 7 (Mild depression)   PHQ-9 Total Score 7        Patient-reported         3/3/2025    Substance Use   Alcohol more than 3/day or more than 7/wk No   Do you use any other substances recreationally? (!) CANNABIS PRODUCTS     Social History     Tobacco Use    Smoking status: Never    Smokeless tobacco: Never   Vaping Use    Vaping status: Never Used   Substance Use Topics    Alcohol use: Not Currently    Drug use: Not Currently           3/3/2025   STI Screening   New sexual partner(s) since last STI/HIV test? No   ASCVD Risk   The 10-year ASCVD risk score (Rosario MELTON, et al., 2019) is: 3.4%    Values used to calculate the score:      Age: 44 years      Sex: Male      Is Non- : No      Diabetic: No      Tobacco smoker: No      Systolic Blood Pressure: 118 mmHg      Is BP treated: No      HDL Cholesterol: 51 mg/dL      Total Cholesterol: 284 mg/dL        3/3/2025   Contraception/Family Planning   Questions about contraception or family planning No        Reviewed and updated as needed this visit by Provider                    Past Medical History:   Diagnosis Date    Depressive disorder 2015     Past Surgical History:   Procedure Laterality Date    ABDOMEN SURGERY  Jan 2021    GENITOURINARY SURGERY  2011    Vasectomy     Labs reviewed in EPIC  BP Readings from Last 3 Encounters:   03/03/25 118/73   01/06/23 128/82   11/08/21 128/82    Wt Readings from Last 3 Encounters:   03/03/25 85.6 kg (188 lb 12.8 oz)   01/06/23 93.9 kg (207 lb)   11/08/21 82.1 kg (181 lb)                  Patient Active Problem List   Diagnosis    Episode of recurrent major depressive disorder, unspecified depression episode severity     Past Surgical History:   Procedure Laterality Date    ABDOMEN SURGERY  Jan 2021    GENITOURINARY SURGERY  2011    Vasectomy       Social History     Tobacco Use    Smoking status: Never    Smokeless tobacco: Never   Substance Use Topics    Alcohol use: Not Currently     Family History   Problem Relation Age of Onset    Diabetes Mother         Type 2    Depression  "Mother     Anxiety Disorder Mother     Obesity Mother     Hypertension Father          Current Outpatient Medications   Medication Sig Dispense Refill    DULoxetine (CYMBALTA) 20 MG capsule Take 1 capsule (20 mg) by mouth daily. 90 capsule 3    rosuvastatin (CRESTOR) 20 MG tablet Take 1 tablet (20 mg) by mouth daily. 90 tablet 3     Allergies   Allergen Reactions    Other [No Clinical Screening - See Comments] Hives     Per pt \"some kind of antibiotic but is unsure which one\"      Recent Labs   Lab Test 01/06/23  0754 11/08/21  1702 02/08/21  1531 06/08/20  1316   * 157* 172* 158*   HDL 51 63 55 56   TRIG 207* 78 112 94   ALT 80* 101* 97* 145*   CR 0.79 0.78 0.80 0.77   GFRESTIMATED >90 >90 >90 >90   GFRESTBLACK  --   --  >90 >90   POTASSIUM 4.5 3.9 4.3 3.8               Review of Systems  Constitutional, HEENT, cardiovascular, pulmonary, GI, , musculoskeletal, neuro, skin, endocrine and psych systems are negative, except as otherwise noted.     Objective    Exam  /73   Pulse 58   Temp 97.8  F (36.6  C) (Temporal)   Resp 16   Ht 1.8 m (5' 10.87\")   Wt 85.6 kg (188 lb 12.8 oz)   SpO2 99%   BMI 26.43 kg/m     Estimated body mass index is 26.43 kg/m  as calculated from the following:    Height as of this encounter: 1.8 m (5' 10.87\").    Weight as of this encounter: 85.6 kg (188 lb 12.8 oz).    Physical Exam  GENERAL: alert and no distress  EYES: Eyes grossly normal to inspection, PERRL and conjunctivae and sclerae normal  HENT: ear canals and TM's normal, nose and mouth without ulcers or lesions  NECK: no adenopathy, no asymmetry, masses, or scars  RESP: lungs clear to auscultation - no rales, rhonchi or wheezes  CV: regular rate and rhythm, normal S1 S2, no S3 or S4, no murmur, click or rub, no peripheral edema  ABDOMEN: soft, nontender, no hepatosplenomegaly, no masses and bowel sounds normal  MS: no gross musculoskeletal defects noted, no edema  SKIN: no suspicious lesions or rashes  NEURO: " Normal strength and tone, mentation intact and speech normal  BACK: no CVA tenderness, no paralumbar tenderness  PSYCH: mentation appears normal, affect normal/bright  LYMPH: no cervical, supraclavicular, axillary, or inguinal adenopathy  Answers submitted by the patient for this visit:  Patient Health Questionnaire (Submitted on 3/3/2025)  If you checked off any problems, how difficult have these problems made it for you to do your work, take care of things at home, or get along with other people?: Somewhat difficult  PHQ9 TOTAL SCORE: 7        Signed Electronically by: Randall Rodríguez MD

## 2025-03-06 ENCOUNTER — TELEPHONE (OUTPATIENT)
Dept: GASTROENTEROLOGY | Facility: CLINIC | Age: 45
End: 2025-03-06
Payer: COMMERCIAL

## 2025-03-06 NOTE — TELEPHONE ENCOUNTER
"Endoscopy Scheduling Screen    Have you had any respiratory illness or flu-like symptoms in the last 10 days?  No    What is your communication preference for Instructions and/or Bowel Prep?   MyChart    What insurance is in the chart?  Other:  [employer]    Ordering/Referring Provider: Marcos   (If ordering provider performs procedure, schedule with ordering provider unless otherwise instructed. )    BMI: Estimated body mass index is 26.43 kg/m  as calculated from the following:    Height as of 3/3/25: 1.8 m (5' 10.87\").    Weight as of 3/3/25: 85.6 kg (188 lb 12.8 oz).     Sedation Ordered  moderate sedation.   If patient BMI > 50 do not schedule in [facility].    If patient BMI > 45 do not schedule at [facility].    Are you taking methadone or Suboxone?  NO, No RN review required.    Have you been diagnosed and are being treated for severe PTSD or severe anxiety?  NO, No RN review required.    Are you taking any prescription medications for pain 3 or more times per week?   NO, No RN review required.    Do you have a history of malignant hyperthermia?  No    (Females) Are you currently pregnant?        Have you been diagnosed or told you have pulmonary hypertension?   No    Do you have an LVAD?  No    Have you been told you have moderate to severe sleep apnea?  No.    Have you been told you have COPD, asthma, or any other lung disease?  No    Has your doctor ordered any cardiac tests like echo, angiogram, stress test, ablation, or EKG, that you have not completed yet?  No    Do you  have a history of any heart conditions?  No     Have you ever had or are you waiting for an organ transplant?  No. Continue scheduling, no site restrictions.    Have you had a stroke or transient ischemic attack (TIA aka \"mini stroke\") in the last 2 years?   No.    Have you been diagnosed with or been told you have cirrhosis of the liver?   No.    Are you currently on dialysis?   No    Do you need assistance transferring?   No    BMI: Estimated body mass " "index is 26.43 kg/m  as calculated from the following:    Height as of 3/3/25: 1.8 m (5' 10.87\").    Weight as of 3/3/25: 85.6 kg (188 lb 12.8 oz).     Is patients BMI > 40 and scheduling location [facility]?  No    Do you take an injectable or oral medication for weight loss or diabetes (excluding insulin)?  No    Do you take the medication Naltrexone?  No    Do you take blood thinners?  No       Prep   Are you currently on dialysis or do you have chronic kidney disease?  No    Do you have a diagnosis of diabetes?  No    Do you have a diagnosis of cystic fibrosis (CF)?  No    On a regular basis do you go 3 -5 days between bowel movements?  No    BMI > 40?  No    Preferred Pharmacy:    [id number] - [place], [place] - [address] & [facility]  [address]  [place] [address]  Phone: [phone] Fax: [phone]      Final Scheduling Details     Procedure scheduled  Colonoscopy    Surgeon:  Rene     Date of procedure:  4/2/25     Pre-OP / PAC:   No - Not required for this site.    Location  [facility] - Patient preference.    Sedation   Moderate Sedation - Per order.      Patient Reminders:   You will receive a call from a Nurse to review instructions and health history.  This assessment must be completed prior to your procedure.  Failure to complete the Nurse assessment may result in the procedure being cancelled.      On the day of your procedure, please designate an adult(s) who can drive you home stay with you for the next 24 hours. The medicines used in the exam will make you sleepy. You will not be able to drive.      You cannot take public transportation, ride share services, or non-medical taxi service without a responsible caregiver.  Medical transport services are allowed with the requirement that a responsible caregiver will receive you at your destination.  We require that drivers and caregivers are confirmed prior to your procedure.   "

## 2025-03-18 ENCOUNTER — TELEPHONE (OUTPATIENT)
Dept: GASTROENTEROLOGY | Facility: CLINIC | Age: 45
End: 2025-03-18
Payer: COMMERCIAL

## 2025-03-18 NOTE — TELEPHONE ENCOUNTER
Attempted to contact patient in order to complete pre assessment questions.     No answer. Left message to return call to [phone] option 3    Callback communication sent via [employer].      Kristine Forbes LPN  Endoscopy Procedure Pre Assessment

## 2025-03-18 NOTE — TELEPHONE ENCOUNTER
Pre visit planning completed.      Procedure details:    Patient scheduled for Colonoscopy on 4-2-25.     Arrival time: 0845. Procedure time 0930    Facility location: [facility]; [facility] [place] [address]. Check in location: 1st Floor [facility].     Sedation type: Conscious sedation     Pre op exam needed? No.    Indication for procedure: screening       Chart review:     Electronic implanted devices? No    Recent diagnosis of diverticulitis within the last 6 weeks? No      Medication review:    Diabetic? No    Anticoagulants? No    Weight loss medication/injectable? No GLP-1 medication per patient's medication list. Nursing to verify with pre-assessment call.    Other medication HOLDING recommendations:  Cannabis/Marijuana: Stop night before procedure.      Prep for procedure:     Bowel prep recommendation: Standard Miralax.   Due to: standard bowel prep    Procedure information and instructions sent via [employer]         Shana Brasher RN  Endoscopy Procedure Pre Assessment   [phone] option 3

## 2025-03-18 NOTE — TELEPHONE ENCOUNTER
Pre assessment completed for upcoming procedure.   (Please see previous telephone encounter notes for complete details)    Patient returned call.       Procedure details:    Arrival time and facility location reviewed.    Pre op exam needed? No.    Designated  policy reviewed. Instructed to have someone stay 6  hours post procedure.       Medication review:    Medications reviewed. Please see supporting documentation below. Holding recommendations discussed (if applicable).   N/A      Prep for procedure:     Procedure prep instructions reviewed.  Patient stated they [facility] bowel [facility] Writer answered all patient questions (if applicable). NPO instructions and dietary changes reviewed.    Patient was instructed to call if any questions or concerns arise.        Any additional information needed:  N/A      Patient verbalized understanding and had no questions or concerns at this time.      Shana Brasher RN  Endoscopy Procedure Pre Assessment   [phone] option 3

## 2025-04-02 ENCOUNTER — HOSPITAL ENCOUNTER (OUTPATIENT)
Facility: CLINIC | Age: 45
Discharge: HOME | End: 2025-04-02
Attending: INTERNAL MEDICINE | Admitting: INTERNAL MEDICINE
Payer: COMMERCIAL

## 2025-04-02 VITALS
WEIGHT: 185 LBS | DIASTOLIC BLOOD PRESSURE: 84 MMHG | HEART RATE: 70 BPM | OXYGEN SATURATION: 95 % | BODY MASS INDEX: 25.9 KG/M2 | SYSTOLIC BLOOD PRESSURE: 108 MMHG | HEIGHT: 71 IN | RESPIRATION RATE: 21 BRPM

## 2025-04-02 LAB — COLONOSCOPY: NORMAL

## 2025-04-02 PROCEDURE — 45380 COLONOSCOPY AND BIOPSY: CPT | Performed by: INTERNAL MEDICINE

## 2025-04-02 PROCEDURE — 88305 TISSUE EXAM BY PATHOLOGIST: CPT | Mod: TC | Performed by: INTERNAL MEDICINE

## 2025-04-02 PROCEDURE — G0500 MOD SEDAT ENDO SERVICE >5YRS: HCPCS | Performed by: INTERNAL MEDICINE

## 2025-04-02 PROCEDURE — 250N000011 HC RX IP 250 OP 636: Performed by: INTERNAL MEDICINE

## 2025-04-02 RX ORDER — FENTANYL CITRATE 50 UG/ML
INJECTION, SOLUTION INTRAMUSCULAR; INTRAVENOUS PRN
Status: DISCONTINUED | OUTPATIENT
Start: 2025-04-02 | End: 2025-04-07 | Stop reason: HOSPADM

## 2025-04-02 ASSESSMENT — ACTIVITIES OF DAILY LIVING (ADL)
ADLS_ACUITY_SCORE: 41

## 2025-04-02 NOTE — H&P
"[facility]  Pre-Endoscopy History and Physical     Kahlil Farley MRN# [id number]   YOB: 1980 Age: 44 year old     Date of Procedure: 4/2/2025  Primary care provider: Randall Rodríguez  Type of Endoscopy: colonoscopy  Reason for Procedure: screening  Type of Anesthesia Anticipated: Moderate Sedation    HPI:    Kahlil is a 44 year old male who will be undergoing the above procedure.      A history and physical has been performed. The patient's medications and allergies have been reviewed. The risks and benefits of the procedure and the sedation options and risks were discussed with the patient.  All questions were answered and informed consent was obtained.      He denies a personal or family history of anesthesia complications or bleeding disorders.     Allergies   Allergen Reactions    Other [No Clinical Screening - See Comments] Hives     Per pt \"some kind of antibiotic but is unsure which one\"         Prior to Admission Medications   Prescriptions Last Dose Informant Patient Reported? Taking?   DULoxetine (CYMBALTA) 20 MG capsule 4/2/2025 Morning  No Yes   Sig: Take 1 capsule (20 mg) by mouth daily.   rosuvastatin (CRESTOR) 20 MG tablet 4/2/2025 Morning  No Yes   Sig: Take 1 tablet (20 mg) by mouth daily.      Facility-Administered Medications: None       Patient Active Problem List   Diagnosis    Episode of recurrent major depressive disorder, unspecified depression episode severity        Past Medical History:   Diagnosis Date    Depressive disorder 2015    Hypercholesteremia         Past Surgical History:   Procedure Laterality Date    GENITOURINARY SURGERY  2011    Vasectomy    HERNIA REPAIR         Social History     Tobacco Use    Smoking status: Never    Smokeless tobacco: Never   Substance Use Topics    Alcohol use: Yes     Comment: few a month       Family History   Problem Relation Age of Onset    Diabetes Mother         Type 2    Depression Mother     Anxiety Disorder " "Mother     Obesity Mother     Hypertension Father        REVIEW OF SYSTEMS:     5 point ROS negative except as noted above in HPI, including Gen., Resp., CV, GI &  system review.      PHYSICAL EXAM:   /90   Pulse 57   Resp 16   Ht 1.803 m (5' 11\")   Wt 83.9 kg (185 lb)   SpO2 98%   BMI 25.80 kg/m   Estimated body mass index is 25.8 kg/m  as calculated from the following:    Height as of this encounter: 1.803 m (5' 11\").    Weight as of this encounter: 83.9 kg (185 lb).   GENERAL APPEARANCE: healthy, alert, and no distress  MENTAL STATUS: alert  AIRWAY EXAM: Mallampatti Class II (visualization of the soft palate, fauces, and uvula)  RESP: lungs clear to auscultation - no rales, rhonchi or wheezes  CV: regular rates and rhythm      DIAGNOSTICS:    Not indicated      IMPRESSION   ASA Class 2 - Mild systemic disease        PLAN:       Plan for colonoscopy. We discussed the risks, benefits and alternatives and the patient wished to proceed.    The above has been forwarded to the consulting provider.      Signed Electronically by: Morris López MD  April 2, 2025    Morris López MD  [facility]  Cell: [phone]  Office Phone: [phone]  Office Fax: [phone]        "

## 2025-04-03 ASSESSMENT — ACTIVITIES OF DAILY LIVING (ADL)
ADLS_ACUITY_SCORE: 41

## 2025-04-04 ASSESSMENT — ACTIVITIES OF DAILY LIVING (ADL)
ADLS_ACUITY_SCORE: 41

## 2025-04-05 ASSESSMENT — ACTIVITIES OF DAILY LIVING (ADL)
ADLS_ACUITY_SCORE: 41

## 2025-04-06 ASSESSMENT — ACTIVITIES OF DAILY LIVING (ADL)
ADLS_ACUITY_SCORE: 41

## 2025-04-07 ENCOUNTER — MYC REFILL (OUTPATIENT)
Dept: FAMILY MEDICINE | Facility: CLINIC | Age: 45
End: 2025-04-07
Payer: COMMERCIAL

## 2025-04-07 DIAGNOSIS — E78.2 MIXED HYPERLIPIDEMIA: ICD-10-CM

## 2025-04-07 LAB
PATH REPORT.COMMENTS IMP SPEC: NORMAL
PATH REPORT.FINAL DX SPEC: NORMAL
PATH REPORT.GROSS SPEC: NORMAL
PATH REPORT.MICROSCOPIC SPEC OTHER STN: NORMAL
PATH REPORT.RELEVANT HX SPEC: NORMAL
PHOTO IMAGE: NORMAL

## 2025-04-07 PROCEDURE — 88305 TISSUE EXAM BY PATHOLOGIST: CPT | Mod: 26

## 2025-04-07 RX ORDER — ROSUVASTATIN CALCIUM 20 MG/1
20 TABLET, COATED ORAL DAILY
Qty: 90 TABLET | Refills: 3 | Status: SHIPPED | OUTPATIENT
Start: 2025-04-07

## 2025-05-17 ENCOUNTER — MYC REFILL (OUTPATIENT)
Dept: FAMILY MEDICINE | Facility: CLINIC | Age: 45
End: 2025-05-17
Payer: COMMERCIAL

## 2025-05-17 DIAGNOSIS — F33.9 EPISODE OF RECURRENT MAJOR DEPRESSIVE DISORDER, UNSPECIFIED DEPRESSION EPISODE SEVERITY: ICD-10-CM

## 2025-05-19 RX ORDER — DULOXETIN HYDROCHLORIDE 20 MG/1
20 CAPSULE, DELAYED RELEASE ORAL DAILY
Qty: 90 CAPSULE | Refills: 3 | Status: SHIPPED | OUTPATIENT
Start: 2025-05-19

## (undated) RX ORDER — FENTANYL CITRATE 50 UG/ML
INJECTION, SOLUTION INTRAMUSCULAR; INTRAVENOUS
Status: DISPENSED
Start: 2025-04-02